# Patient Record
Sex: MALE | URBAN - METROPOLITAN AREA
[De-identification: names, ages, dates, MRNs, and addresses within clinical notes are randomized per-mention and may not be internally consistent; named-entity substitution may affect disease eponyms.]

---

## 2017-01-30 DIAGNOSIS — H40.1132 PRIMARY OPEN ANGLE GLAUCOMA OF BOTH EYES, MODERATE STAGE: Primary | ICD-10-CM

## 2017-01-30 RX ORDER — DORZOLAMIDE HYDROCHLORIDE AND TIMOLOL MALEATE 20; 5 MG/ML; MG/ML
1 SOLUTION/ DROPS OPHTHALMIC 2 TIMES DAILY
Qty: 10 ML | Refills: 2 | Status: SHIPPED | OUTPATIENT
Start: 2017-01-30 | End: 2017-08-04

## 2017-01-30 NOTE — TELEPHONE ENCOUNTER
COSOPT 2-0.5 %      Last Written Prescription Date:  11/24/15  Last Fill Quantity:  ,   # refills: 11  Last Office Visit : 7/14/16  Future Office visit:  4/9/17

## 2017-02-09 ENCOUNTER — OFFICE VISIT (OUTPATIENT)
Dept: OPHTHALMOLOGY | Facility: CLINIC | Age: 77
End: 2017-02-09
Attending: OPHTHALMOLOGY
Payer: MEDICARE

## 2017-02-09 DIAGNOSIS — H25.13 NUCLEAR SCLEROSIS, BILATERAL: ICD-10-CM

## 2017-02-09 DIAGNOSIS — H40.1190 POAG (PRIMARY OPEN-ANGLE GLAUCOMA): ICD-10-CM

## 2017-02-09 DIAGNOSIS — H40.1133 PRIMARY OPEN ANGLE GLAUCOMA OF BOTH EYES, SEVERE STAGE: Primary | ICD-10-CM

## 2017-02-09 PROCEDURE — 99214 OFFICE O/P EST MOD 30 MIN: CPT | Mod: 25,ZF

## 2017-02-09 PROCEDURE — 92083 EXTENDED VISUAL FIELD XM: CPT | Mod: ZF | Performed by: OPHTHALMOLOGY

## 2017-02-09 RX ORDER — ACETAZOLAMIDE 500 MG/1
500 CAPSULE, EXTENDED RELEASE ORAL 2 TIMES DAILY
Qty: 60 CAPSULE | Refills: 3 | Status: SHIPPED | OUTPATIENT
Start: 2017-02-09 | End: 2018-05-03

## 2017-02-09 ASSESSMENT — REFRACTION_WEARINGRX
OS_SPHERE: -0.50
OS_AXIS: 010
OD_CYLINDER: +1.25
OD_ADD: +2.75
OD_SPHERE: -0.50
OS_ADD: +2.75
OD_AXIS: 008
OS_CYLINDER: +1.50

## 2017-02-09 ASSESSMENT — VISUAL ACUITY
METHOD: SNELLEN - LINEAR
OS_PH_CC: 20/40+2
METHOD_MR: DECLINES MR TODAY.
OS_CC: 20/40-1
CORRECTION_TYPE: GLASSES
OD_CC: 20/25
OD_CC+: -3
OS_CC+: +2

## 2017-02-09 ASSESSMENT — EXTERNAL EXAM - LEFT EYE: OS_EXAM: NORMAL

## 2017-02-09 ASSESSMENT — CONF VISUAL FIELD
OD_SUPERIOR_NASAL_RESTRICTION: 3
METHOD: COUNTING FINGERS
OS_NORMAL: 1
OD_SUPERIOR_TEMPORAL_RESTRICTION: 3

## 2017-02-09 ASSESSMENT — TONOMETRY
OD_IOP_MMHG: 33
IOP_METHOD: TONOPEN
IOP_METHOD: APPLANATION
OS_IOP_MMHG: 17
OD_IOP_MMHG: 32
OS_IOP_MMHG: 17

## 2017-02-09 ASSESSMENT — SLIT LAMP EXAM - LIDS
COMMENTS: NORMAL
COMMENTS: NORMAL

## 2017-02-09 ASSESSMENT — CUP TO DISC RATIO
OD_RATIO: 0.85
OS_RATIO: 0.5

## 2017-02-09 ASSESSMENT — EXTERNAL EXAM - RIGHT EYE: OD_EXAM: NORMAL

## 2017-02-09 NOTE — PROGRESS NOTES
1)POAG -- s/p SLT OD (1/10 and 6/14) -- K pachy:531/562 Tmax: 29/28 HVF: OD:Superior arcuate stable from 3706-8175 with possible progression in 2014 and OD:Sup arcuate with IN dec sens in 2016 and OS:Fluct  CDR:0.85/0.5 HRT/OCT: OD:Mod RNFL thinning with ?prog in 2016 and OS:RNFL WNL FHX of Glc: No Gonio: open Intolerant to: Asthma/COPD: No, tolerating topical BB Steroid Use: Yes Kidney Stones: No Sulfa Allergy: No IOP targets: OD:M-Hteens and OS:Hteens-L20s  -- IOP above target OD with slow progression on HVF over several years -- rec phaco/trab OD   2)H/O Ch Nevus OS  3)NS OU -- pt leaving for 1 month vacation on March 19  4)S/p evacuation of bilateral subdural hematoma 7/2/16 -- H/O CTs and MRI at [facility] in 2016    MD:Glare test OD:20/80 and OS:20/60    Patient will continue on Cosopt (Timolol/Dorzolamide) which is a blue top drop 2x/day (12 hours apart) in both eyes, Latanoprost which is a teal top drop at bedtime in both eyes, and Alphagan (Brimonidine) which is a purple top drop 3x/day (8 hours apart) in the right eye. A long discussion of the risks, benefits, and alternatives including potential treatment and management options were had with patient and a decision was made to proceed with combined cataract and glaucoma surgery (Trabeculectomy) in the right eye.    Patient will also begin Diamox 500mg Sequels 2x/day (12 hours apart) as a temporizing measure prior to surgery.  Patient will return to clinic in 2-3 weeks with repeat IOP check prior to leaving for vacation.  Patient will talk to his primary care doctor prior to starting the medication.

## 2017-02-09 NOTE — NURSING NOTE
Chief Complaints and History of Present Illnesses   Patient presents with     Primary Open Angle Glaucoma Follow Up     7 month follow up for POAG -- s/p SLT OD (1/10 and 6/14).     HPI    Affected eye(s):  Both   Symptoms:     (Comment: Vision is unchanged BE.)   No floaters   Flashes (Comment: Gets somtimes, but not recently. Unsure what eye.)   No redness (Comment: One month ago, thinks LE was red. fine now.)   No tearing   No itching         Do you have eye pain now?:  No      Comments:  7 month follow up for POAG -- s/p SLT OD (1/10 and 6/14).    Efraín PERALTA  1:02 PM02/09/2017

## 2017-02-09 NOTE — MR AVS SNAPSHOT
After Visit Summary   2/9/2017    Rusty Harris    MRN: [id number]           Patient Information     Date Of Birth          1940        Visit Information        Provider Department      2/9/2017 1:00 PM Glenys Green MD [facility]        Today's Diagnoses     Primary open angle glaucoma of both eyes, severe stage    -  1     POAG (primary open-angle glaucoma)         Nuclear sclerosis, bilateral           Care Instructions    Patient will continue on Cosopt (Timolol/Dorzolamide) which is a blue top drop 2x/day (12 hours apart) in both eyes, Latanoprost which is a teal top drop at bedtime in both eyes, and Alphagan (Brimonidine) which is a purple top drop 3x/day (8 hours apart) in the right eye. A long discussion of the risks, benefits, and alternatives including potential treatment and management options were had with patient and a decision was made to proceed with combined cataract and glaucoma surgery (Trabeculectomy) in the right eye.    Patient will also begin Diamox 500mg Sequels 2x/day (12 hours apart) as a temporizing measure prior to surgery.  Patient will talk to his primary care doctor prior to starting the medication.          Follow-ups after your visit        Follow-up notes from your care team     Return for  2-3 weeks with repeat IOP check.      Your next 10 appointments already scheduled     Mar 02, 2017  9:15 AM   RETURN GLAUCOMA with Glenys Green MD   Eye Clinic ([facility])    Paul Whitney  [address]   [address] Clin [address] [place] [address]   [phone]            May 09, 2017 10:15 AM   Post-Op with Glenys Green MD   Eye Clinic ([facility])    Paul Whitney  [address]  [address] Clin [address] [place] [address]   [phone]            May 18, 2017 10:15 AM   Post-Op with Glenys Green MD   Eye Clinic ([facility])    Paul Whitney  [address]  [address] Clin [address] [place] [address]  [address]   [phone]              Who to contact     Please call your clinic at [phone] to:    Ask questions about your health    Make or cancel appointments    Discuss your medicines    Learn about your test results    Speak to your doctor   If you have compliments or concerns about an experience at your clinic, or if you wish to file a complaint, please contact [facility] Patient Relations at [phone] or email us at [email or web address]         Additional Information About Your Visit        [employer] Information     Infrascale is an electronic gateway that provides easy, online access to your medical records. With Infrascale, you can request a clinic appointment, read your test results, renew a prescription or communicate with your care team.     To sign up for Infrascale visit the website at [email or web address]   You will be asked to enter the access code listed below, as well as some personal information. Please follow the directions to create your username and password.     Your access code is: [email or web address]  Expires: 2017  8:30 AM     Your access code will  in 90 days. If you need help or a new code, please contact your [facility] Clinic or call [phone] for assistance.        Care EveryWhere ID     This is your Care EveryWhere ID. This could be used by other organizations to access your [place] medical records  [id number]         Blood Pressure from Last 3 Encounters:   No data found for BP    Weight from Last 3 Encounters:   No data found for Wt              We Performed the Following     OVF 24-2 Dynamic OD     Tawny-Operative Worksheet (Glaucoma)          Today's Medication Changes          These changes are accurate as of: 17  2:37 PM.  If you have any questions, ask your nurse or doctor.               Start taking these medicines.        Dose/Directions    acetaZOLAMIDE 500 MG 12 hr capsule   Commonly known as:   DIAMOX SEQUEL   Used for:  Primary open angle glaucoma of both eyes, severe stage   Started by:  Glenys Green MD        Dose:  500 mg   Take 1 capsule (500 mg) by mouth 2 times daily   Quantity:  60 capsule   Refills:  3            Where to get your medicines      These medications were sent to [facility] - [place], [place] - [address]  [address], [facility] [address]     Phone:  [phone]    - acetaZOLAMIDE 500 MG 12 hr capsule             Primary Care Provider Office Phone # Fax #    Gustabo Melendez [phone]       [facility] [address] [address]        Thank you!     Thank you for choosing EYE CLINIC  for your care. Our goal is always to provide you with excellent care. Hearing back from our patients is one way we can continue to improve our services. Please take a few minutes to complete the written survey that you may receive in the mail after your visit with us. Thank you!             Your Updated Medication List - Protect others around you: Learn how to safely use, store and throw away your medicines at www.disposemymeds.org.          This list is accurate as of: 2/9/17  2:37 PM.  Always use your most recent med list.                   Brand Name Dispense Instructions for use    acetaZOLAMIDE 500 MG 12 hr capsule    DIAMOX SEQUEL    60 capsule    Take 1 capsule (500 mg) by mouth 2 times daily       * brimonidine 0.1 % ophthalmic solution    ALPHAGAN P     Place 1 drop into the right eye 2 times daily       * brimonidine 0.2 % ophthalmic solution    ALPHAGAN    15 mL    Place 1 drop into the right eye 3 times daily       dorzolamide-timolol 2-0.5 % ophthalmic solution    COSOPT    10 mL    Place 1 drop into both eyes 2 times daily *12 HOURS APART       latanoprost 0.005 % ophthalmic solution    XALATAN    2.5 mL    Place 1 drop into both eyes At Bedtime       SIMVASTATIN PO          * Notice:  This list  has 2 medication(s) that are the same as other medications prescribed for you. Read the directions carefully, and ask your doctor or other care provider to review them with you.

## 2017-02-10 DIAGNOSIS — H40.1132 PRIMARY OPEN ANGLE GLAUCOMA OF BOTH EYES, MODERATE STAGE: Primary | ICD-10-CM

## 2017-02-13 RX ORDER — BRIMONIDINE TARTRATE 2 MG/ML
1 SOLUTION/ DROPS OPHTHALMIC 3 TIMES DAILY
Qty: 15 ML | Refills: 3 | Status: SHIPPED | OUTPATIENT
Start: 2017-02-13 | End: 2018-03-16

## 2017-02-13 NOTE — TELEPHONE ENCOUNTER
brimonidine (ALPHAGAN) 0.2 %      Last Written Prescription Date:  11/24/15  Last Fill Quantity: 15ML,   # refills: 11  Last Office Visit : 2/9/17  Future Office visit:  3/2/17

## 2017-03-09 ENCOUNTER — OFFICE VISIT (OUTPATIENT)
Dept: OPHTHALMOLOGY | Facility: CLINIC | Age: 77
End: 2017-03-09
Attending: OPHTHALMOLOGY
Payer: MEDICARE

## 2017-03-09 DIAGNOSIS — H25.13 NUCLEAR SCLEROSIS, BILATERAL: ICD-10-CM

## 2017-03-09 DIAGNOSIS — H40.1133 PRIMARY OPEN ANGLE GLAUCOMA OF BOTH EYES, SEVERE STAGE: Primary | ICD-10-CM

## 2017-03-09 PROCEDURE — 99212 OFFICE O/P EST SF 10 MIN: CPT | Mod: ZF

## 2017-03-09 ASSESSMENT — TONOMETRY
OS_IOP_MMHG: 12
IOP_METHOD: APPLANATION
OD_IOP_MMHG: 22

## 2017-03-09 ASSESSMENT — VISUAL ACUITY
OD_CC: 20/25
CORRECTION_TYPE: GLASSES
METHOD: SNELLEN - LINEAR
OD_CC+: -1
OS_CC: 20/40
OS_PH_CC: 20/40+2
OS_CC+: -1

## 2017-03-09 ASSESSMENT — SLIT LAMP EXAM - LIDS
COMMENTS: NORMAL
COMMENTS: NORMAL

## 2017-03-09 ASSESSMENT — EXTERNAL EXAM - RIGHT EYE: OD_EXAM: NORMAL

## 2017-03-09 ASSESSMENT — REFRACTION_WEARINGRX
OS_CYLINDER: +1.50
OD_AXIS: 008
OD_ADD: +2.75
OD_CYLINDER: +1.25
OD_SPHERE: -0.50
OS_AXIS: 010
OS_ADD: +2.75
OS_SPHERE: -0.50

## 2017-03-09 ASSESSMENT — EXTERNAL EXAM - LEFT EYE: OS_EXAM: NORMAL

## 2017-03-09 NOTE — MR AVS SNAPSHOT
After Visit Summary   3/9/2017    Rusty Harris    MRN: [id number]           Patient Information     Date Of Birth          1940        Visit Information        Provider Department      3/9/2017 8:00 AM Glenys Green MD [facility]        Today's Diagnoses     Primary open angle glaucoma of both eyes, severe stage    -  1    Nuclear sclerosis, bilateral          Care Instructions    Patient will continue on Cosopt (Timolol/Dorzolamide) which is a blue top drop 2x/day (12 hours apart) in both eyes, Latanoprost which is a teal top drop at bedtime in both eyes, and Alphagan (Brimonidine) which is a purple top drop 3x/day (8 hours apart) in the right eye. A long discussion of the risks, benefits, and alternatives including potential treatment and management options were had with patient and a decision was made to proceed with combined cataract and glaucoma surgery (Trabeculectomy) in the right eye.      Patient will also continue on Diamox 500mg Sequels 2x/day (12 hours apart) as a temporizing measure prior to surgery.   Patient will talk to his primary care doctor prior to starting the medication.          Follow-ups after your visit        Your next 10 appointments already scheduled     Apr 25, 2017 10:15 AM CDT   Post-Op with Glenys Green MD   Eye Clinic ([facility])    Paul Ageeg  [address] [place] [address]   [phone]            May 02, 2017  1:00 PM CDT   Post-Op with Glenys Green MD   Eye Clinic ([facility])    Paul Gage Blg  [address]   [address] [address]   [phone]            May 16, 2017 10:15 AM CDT   Post-Op with Glenys Green MD   Eye Clinic ([facility])    Paul Gage Blg  [address]   [address] [address]   [phone]            May 25, 2017 10:00 AM CDT   Post-Op with Glenys Green MD   Eye Clinic ([facility])    [facility]  [address]  9[address] Clin [address]  [facility] [address]   [phone]              Who to contact     Please call your clinic at [phone] to:    Ask questions about your health    Make or cancel appointments    Discuss your medicines    Learn about your test results    Speak to your doctor   If you have compliments or concerns about an experience at your clinic, or if you wish to file a complaint, please contact [facility] Patient Relations at [phone] or email us at [email or web address]         Additional Information About Your Visit        [employer] Information     InsightETEt is an electronic gateway that provides easy, online access to your medical records. With [employer], you can request a clinic appointment, read your test results, renew a prescription or communicate with your care team.     To sign up for InsightETEt visit the website at [email or web address]   You will be asked to enter the access code listed below, as well as some personal information. Please follow the directions to create your username and password.     Your access code is: [email or web address]  Expires: 2017  8:30 AM     Your access code will  in 90 days. If you need help or a new code, please contact your [facility] Clinic or call [phone] for assistance.        Care EveryWhere ID     This is your Care EveryWhere ID. This could be used by other organizations to access your [place] medical records  [id number]         Blood Pressure from Last 3 Encounters:   No data found for BP    Weight from Last 3 Encounters:   No data found for Wt              We Performed the Following     Tawny-Operative Worksheet (Glaucoma)        Primary Care Provider Office Phone # Fax #    Gustabo Melendez [phone]       [address] [address]        Thank you!     Thank you for  choosing EYE CLINIC  for your care. Our goal is always to provide you with excellent care. Hearing back from our patients is one way we can continue to improve our services. Please take a few minutes to complete the written survey that you may receive in the mail after your visit with us. Thank you!             Your Updated Medication List - Protect others around you: Learn how to safely use, store and throw away your medicines at www.disposemymeds.org.          This list is accurate as of: 3/9/17  9:00 AM.  Always use your most recent med list.                   Brand Name Dispense Instructions for use    acetaZOLAMIDE 500 MG 12 hr capsule    DIAMOX SEQUEL    60 capsule    Take 1 capsule (500 mg) by mouth 2 times daily       * brimonidine 0.1 % ophthalmic solution    ALPHAGAN P     Place 1 drop into the right eye 2 times daily       * brimonidine 0.2 % ophthalmic solution    ALPHAGAN    15 mL    Place 1 drop into the right eye 3 times daily       dorzolamide-timolol 2-0.5 % ophthalmic solution    COSOPT    10 mL    Place 1 drop into both eyes 2 times daily *12 HOURS APART       latanoprost 0.005 % ophthalmic solution    XALATAN    2.5 mL    Place 1 drop into both eyes At Bedtime       SIMVASTATIN PO          * Notice:  This list has 2 medication(s) that are the same as other medications prescribed for you. Read the directions carefully, and ask your doctor or other care provider to review them with you.

## 2017-03-09 NOTE — NURSING NOTE
Chief Complaints and History of Present Illnesses   Patient presents with     Glaucoma Follow Up     4 week follow up IOP check     HPI    Affected eye(s):  Both   Symptoms:     No floaters   No flashes   No redness   No Dryness         Do you have eye pain now?:  No      Comments:  Pt states vision is about the same as last visit.   Brian JOSE March 9, 2017 7:52 AM

## 2017-03-09 NOTE — PROGRESS NOTES
1)POAG -- s/p SLT OD (1/10 and 6/14) -- K pachy:531/562 Tmax: 29/28 HVF: OD:Superior arcuate stable from 6167-6343 with possible progression in 2014 and OD:Sup arcuate with IN dec sens in 2016 and OS:Fluct  CDR:0.85/0.5 HRT/OCT: OD:Mod RNFL thinning with ?prog in 2016 and OS:RNFL WNL FHX of Glc: No Gonio: open Intolerant to: Asthma/COPD: No, tolerating topical BB Steroid Use: Yes Kidney Stones: No Sulfa Allergy: No IOP targets: OD:M-Hteens and OS:Hteens-L20s  -- IOP above target OD with slow progression on HVF over several years -- rec phaco/trab OD   2)H/O Ch Nevus OS  3)NS OU -- pt leaving for 1 month vacation on March 19 -- rec OD:Phaco/trab and OS:Phaco/MIGS -- pt will call if intersted in doing the left eye prior to right eye  4)S/p evacuation of bilateral subdural hematoma 7/2/16 -- H/O CTs and MRI at [facility] in 2016    MD:Glare test OD:20/80 and OS:20/60    Patient will continue on Cosopt (Timolol/Dorzolamide) which is a blue top drop 2x/day (12 hours apart) in both eyes, Latanoprost which is a teal top drop at bedtime in both eyes, and Alphagan (Brimonidine) which is a purple top drop 3x/day (8 hours apart) in the right eye. A long discussion of the risks, benefits, and alternatives including potential treatment and management options were had with patient and a decision was made to proceed with combined cataract and glaucoma surgery (Trabeculectomy) in the right eye.      Patient will also continue on Diamox 500mg Sequels 2x/day (12 hours apart) as a temporizing measure prior to surgery.   Patient will talk to his primary care doctor prior to starting the medication.      Attending Physician Attestation:  Complete documentation of historical and exam elements from today's encounter can be found in the full encounter summary report (not reduplicated in this progress note). I personally obtained the chief complaint(s) and history of present illness.  I confirmed and edited as necessary the review of systems,  past medical/surgical history, family history, social history, and examination findings as documented by others; and I examined the patient myself. I personally reviewed the relevant tests, images, and reports as documented above. I formulated and edited as necessary the assessment and plan and discussed the findings and management plan with the patient and family.  - Glenys Green MD 3:52 PM 3/29/2017

## 2017-03-09 NOTE — PATIENT INSTRUCTIONS
Patient will continue on Cosopt (Timolol/Dorzolamide) which is a blue top drop 2x/day (12 hours apart) in both eyes, Latanoprost which is a teal top drop at bedtime in both eyes, and Alphagan (Brimonidine) which is a purple top drop 3x/day (8 hours apart) in the right eye. A long discussion of the risks, benefits, and alternatives including potential treatment and management options were had with patient and a decision was made to proceed with combined cataract and glaucoma surgery (Trabeculectomy) in the right eye.      Patient will also continue on Diamox 500mg Sequels 2x/day (12 hours apart) as a temporizing measure prior to surgery.   Patient will talk to his primary care doctor prior to starting the medication.

## 2017-04-24 ENCOUNTER — TRANSFERRED RECORDS (OUTPATIENT)
Dept: HEALTH INFORMATION MANAGEMENT | Facility: CLINIC | Age: 77
End: 2017-04-24

## 2017-04-25 ENCOUNTER — OFFICE VISIT (OUTPATIENT)
Dept: OPHTHALMOLOGY | Facility: CLINIC | Age: 77
End: 2017-04-25
Attending: OPHTHALMOLOGY
Payer: MEDICARE

## 2017-04-25 DIAGNOSIS — H40.1133 PRIMARY OPEN ANGLE GLAUCOMA OF BOTH EYES, SEVERE STAGE: Primary | ICD-10-CM

## 2017-04-25 PROCEDURE — 99212 OFFICE O/P EST SF 10 MIN: CPT | Mod: ZF

## 2017-04-25 RX ORDER — KETOROLAC TROMETHAMINE 5 MG/ML
1 SOLUTION OPHTHALMIC 3 TIMES DAILY
Qty: 1 BOTTLE | Refills: 0 | Status: SHIPPED | OUTPATIENT
Start: 2017-04-25 | End: 2017-09-21

## 2017-04-25 RX ORDER — CIPROFLOXACIN HYDROCHLORIDE 3.5 MG/ML
1 SOLUTION/ DROPS TOPICAL 4 TIMES DAILY
Qty: 1 BOTTLE | Refills: 0 | Status: SHIPPED | OUTPATIENT
Start: 2017-04-25 | End: 2017-05-25

## 2017-04-25 RX ORDER — PREDNISOLONE ACETATE 10 MG/ML
1 SUSPENSION/ DROPS OPHTHALMIC
Qty: 1 BOTTLE | Refills: 3 | Status: SHIPPED | OUTPATIENT
Start: 2017-04-25 | End: 2017-09-21

## 2017-04-25 ASSESSMENT — EXTERNAL EXAM - RIGHT EYE: OD_EXAM: NORMAL

## 2017-04-25 ASSESSMENT — VISUAL ACUITY
OS_SC: 20/40
OD_SC+: +1
OD_PH_SC: 20/30-1
OS_SC+: -3
METHOD: SNELLEN - LINEAR
OS_PH_SC: 20/25-3
OD_SC: 20/70

## 2017-04-25 ASSESSMENT — TONOMETRY
IOP_METHOD: APPLANATION
OS_IOP_MMHG: 16
OD_IOP_MMHG: 30

## 2017-04-25 ASSESSMENT — CONF VISUAL FIELD
OD_SUPERIOR_TEMPORAL_RESTRICTION: 3
OS_NORMAL: 1
OD_SUPERIOR_NASAL_RESTRICTION: 3

## 2017-04-25 ASSESSMENT — SLIT LAMP EXAM - LIDS
COMMENTS: NORMAL
COMMENTS: NORMAL

## 2017-04-25 ASSESSMENT — EXTERNAL EXAM - LEFT EYE: OS_EXAM: NORMAL

## 2017-04-25 NOTE — NURSING NOTE
Chief Complaints and History of Present Illnesses   Patient presents with     Post Op (Ophthalmology) Right Eye     S/P CE IOL with Trab with [facility] 1 day     HPI    Last Eye Exam:  3/9/17   Affected eye(s):  Right   Symptoms:     No floaters   No flashes      Duration:  1 day   Frequency:  Constant       Do you have eye pain now?:  No      Comments:  Pt slept well last night. Vision is improved today. Denies any pain or discomfort. MD please review with pt post operative instructions today. MATHIEU SMITH, COA 10:30 AM 04/25/2017

## 2017-04-25 NOTE — PROGRESS NOTES
1)POAG -- s/p Trab OD (4/24/17), s/p SLT OD (1/10 and 6/14) -- K pachy:531/562 Tmax: 29/28 HVF: OD:Superior arcuate stable from 5194-3505 with possible progression in 2014 and OD:Sup arcuate with IN dec sens in 2016 and OS:Fluct  CDR:0.85/0.5 HRT/OCT: OD:Mod RNFL thinning with ?prog in 2016 and OS:RNFL WNL FHX of Glc: No Gonio: open Intolerant to: Asthma/COPD: No, tolerating topical BB Steroid Use: Yes Kidney Stones: No Sulfa Allergy: No IOP targets: OD:M-Hteens and OS:Hteens-L20s  --  2)H/O Ch Nevus OS  3)NS OS -- rec OD:Phaco/trab and OS:Phaco/MIGS   4)S/p evacuation of bilateral subdural hematoma 7/2/16 -- H/O CTs and MRI at [facility] in 2016  5)PCIOL OD    MD:Glare test OD:20/80 and OS:20/60    Patient will continue on Cosopt (Timolol/Dorzolamide) which is a blue top drop 2x/day (12 hours apart) in the left eye only, Latanoprost which is a teal top drop at bedtime in the left eye only, and discontinue and hold onto Alphagan (Brimonidine) which is a purple top drop.  No heavy lifting, bending, stooping, straining, rubbing the eye, or getting water in the eye.  Please wear protective eyewear over the eye at all times including glasses during the day and the protective shield at bedtime.  Patient will return to clinic in 1 weeks with repeat evaluation.    Use the following drops (RIGHT EYE ONLY):  Antibiotic --  Ciprofloxacin: 4x/day until finished (use for at least 5-7 days after surgery)    NSAID -- Acular LS (Ketorolac) 3x/day: for approximately 4 weeks after surgery (or until gone)    Steroid -- Pred Forte/Prednisolone Acetate:  Every 2 hours while awake    Please be sure to call if you have any decrease in vision, increase in pain, flashing lights, redness, or a lot of drainage.

## 2017-04-25 NOTE — PATIENT INSTRUCTIONS
Patient will continue on Cosopt (Timolol/Dorzolamide) which is a blue top drop 2x/day (12 hours apart) in the left eye only, Latanoprost which is a teal top drop at bedtime in the left eye only, and discontinue and hold onto Alphagan (Brimonidine) which is a purple top drop.  No heavy lifting, bending, stooping, straining, rubbing the eye, or getting water in the eye.  Please wear protective eyewear over the eye at all times including glasses during the day and the protective shield at bedtime.  Patient will return to clinic in 1 weeks with repeat evaluation.    Use the following drops (RIGHT EYE ONLY):  Antibiotic --  Ciprofloxacin: 4x/day until finished (use for at least 5-7 days after surgery)    NSAID -- Acular LS (Ketorolac) 3x/day: for approximately 4 weeks after surgery (or until gone)    Steroid -- Pred Forte/Prednisolone Acetate:  Every 2 hours while awake    Please be sure to call if you have any decrease in vision, increase in pain, flashing lights, redness, or a lot of drainage.

## 2017-04-25 NOTE — MR AVS SNAPSHOT
After Visit Summary   4/25/2017    Rusty Harris    MRN: [id number]           Patient Information     Date Of Birth          1940        Visit Information        Provider Department      4/25/2017 10:15 AM Glenys Green MD [facility]        Today's Diagnoses     Primary open angle glaucoma of both eyes, severe stage    -  1      Care Instructions    Patient will continue on Cosopt (Timolol/Dorzolamide) which is a blue top drop 2x/day (12 hours apart) in the left eye only, Latanoprost which is a teal top drop at bedtime in the left eye only, and discontinue and hold onto Alphagan (Brimonidine) which is a purple top drop.  No heavy lifting, bending, stooping, straining, rubbing the eye, or getting water in the eye.  Please wear protective eyewear over the eye at all times including glasses during the day and the protective shield at bedtime.  Patient will return to clinic in 1 weeks with repeat evaluation.    Use the following drops (RIGHT EYE ONLY):  Antibiotic --  Ciprofloxacin: 4x/day until finished (use for at least 5-7 days after surgery)    NSAID -- Acular LS (Ketorolac) 3x/day: for approximately 4 weeks after surgery (or until gone)    Steroid -- Pred Forte/Prednisolone Acetate:  Every 2 hours while awake    Please be sure to call if you have any decrease in vision, increase in pain, flashing lights, redness, or a lot of drainage.          Follow-ups after your visit        Your next 10 appointments already scheduled     May 02, 2017  1:00 PM CDT   Post-Op with Glenys Green MD   Eye Clinic ([facility])    Paul Gage [address]  [address] [address]   [phone]              Who to contact     Please call your clinic at [phone] to:    Ask questions about your health    Make or cancel appointments    Discuss your medicines    Learn about your test results    Speak to your doctor   If you have compliments or concerns about an  experience at your clinic, or if you wish to file a complaint, please contact [facility] Patient Relations at [phone] or email us at [email or web address]         Additional Information About Your Visit        The Wet SealharUUSEE Information     HealthMedia is an electronic gateway that provides easy, online access to your medical records. With HealthMedia, you can request a clinic appointment, read your test results, renew a prescription or communicate with your care team.     To sign up for HealthMedia visit the website at [email or web address]   You will be asked to enter the access code listed below, as well as some personal information. Please follow the directions to create your username and password.     Your access code is: [email or web address]  Expires: 2017  9:30 AM     Your access code will  in 90 days. If you need help or a new code, please contact your [facility] or call [phone] for assistance.        Care EveryWhere ID     This is your Care EveryWhere ID. This could be used by other organizations to access your [place] medical records  [id number]         Blood Pressure from Last 3 Encounters:   No data found for BP    Weight from Last 3 Encounters:   No data found for Wt              Today, you had the following     No orders found for display         Today's Medication Changes          These changes are accurate as of: 17 11:57 AM.  If you have any questions, ask your nurse or doctor.               Start taking these medicines.        Dose/Directions    ciprofloxacin 0.3 % ophthalmic solution   Commonly known as:  CILOXAN   Used for:  Primary open angle glaucoma of both eyes, severe stage   Started by:  Glenys Green MD        Dose:  1 drop   Place 1 drop into the right eye 4 times daily   Quantity:  1 Bottle   Refills:  0       ketorolac 0.5 % ophthalmic solution   Commonly known as:  ACULAR   Used for:  Primary open angle  glaucoma of both eyes, severe stage   Started by:  Glenys Green MD        Dose:  1 drop   Place 1 drop into the right eye 3 times daily   Quantity:  1 Bottle   Refills:  0       prednisoLONE acetate 1 % ophthalmic susp   Commonly known as:  PRED FORTE   Used for:  Primary open angle glaucoma of both eyes, severe stage   Started by:  Glenys Green MD        Dose:  1 drop   Place 1 drop into the right eye every 2 hours   Quantity:  1 Bottle   Refills:  3            Where to get your medicines      These medications were sent to [facility] - [address], [facility] [address]     Phone:  [phone]     ciprofloxacin 0.3 % ophthalmic solution    ketorolac 0.5 % ophthalmic solution    prednisoLONE acetate 1 % ophthalmic susp                Primary Care Provider Office Phone # Fax #    Gustabo Melendez [phone]       [address] [address]        Thank you!     Thank you for choosing EYE CLINIC  for your care. Our goal is always to provide you with excellent care. Hearing back from our patients is one way we can continue to improve our services. Please take a few minutes to complete the written survey that you may receive in the mail after your visit with us. Thank you!             Your Updated Medication List - Protect others around you: Learn how to safely use, store and throw away your medicines at www.disposemymeds.org.          This list is accurate as of: 4/25/17 11:57 AM.  Always use your most recent med list.                   Brand Name Dispense Instructions for use    acetaZOLAMIDE 500 MG 12 hr capsule    DIAMOX SEQUEL    60 capsule    Take 1 capsule (500 mg) by mouth 2 times daily       * brimonidine 0.1 % ophthalmic solution    ALPHAGAN P     Place 1 drop into the right eye 2 times daily       * brimonidine 0.2 % ophthalmic solution    ALPHAGAN    15 mL    Place 1  drop into the right eye 3 times daily       ciprofloxacin 0.3 % ophthalmic solution    CILOXAN    1 Bottle    Place 1 drop into the right eye 4 times daily       dorzolamide-timolol 2-0.5 % ophthalmic solution    COSOPT    10 mL    Place 1 drop into both eyes 2 times daily *12 HOURS APART       ketorolac 0.5 % ophthalmic solution    ACULAR    1 Bottle    Place 1 drop into the right eye 3 times daily       latanoprost 0.005 % ophthalmic solution    XALATAN    2.5 mL    Place 1 drop into both eyes At Bedtime       prednisoLONE acetate 1 % ophthalmic susp    PRED FORTE    1 Bottle    Place 1 drop into the right eye every 2 hours       SIMVASTATIN PO          * Notice:  This list has 2 medication(s) that are the same as other medications prescribed for you. Read the directions carefully, and ask your doctor or other care provider to review them with you.

## 2017-05-02 ENCOUNTER — OFFICE VISIT (OUTPATIENT)
Dept: OPHTHALMOLOGY | Facility: CLINIC | Age: 77
End: 2017-05-02
Attending: OPHTHALMOLOGY
Payer: MEDICARE

## 2017-05-02 DIAGNOSIS — H40.1132 PRIMARY OPEN ANGLE GLAUCOMA OF BOTH EYES, MODERATE STAGE: Primary | ICD-10-CM

## 2017-05-02 PROCEDURE — 99212 OFFICE O/P EST SF 10 MIN: CPT | Mod: ZF

## 2017-05-02 ASSESSMENT — SLIT LAMP EXAM - LIDS
COMMENTS: NORMAL
COMMENTS: NORMAL

## 2017-05-02 ASSESSMENT — TONOMETRY
OD_IOP_MMHG: 29
IOP_METHOD: APPLANATION
OS_IOP_MMHG: 19

## 2017-05-02 ASSESSMENT — VISUAL ACUITY
OD_CC+: -2
OD_CC: 20/20
OS_PH_CC: 20/30
METHOD: SNELLEN - LINEAR
OS_CC: 20/40
OS_CC+: +2

## 2017-05-02 ASSESSMENT — CONF VISUAL FIELD
OD_INFERIOR_NASAL_RESTRICTION: 3
OD_SUPERIOR_TEMPORAL_RESTRICTION: 3
OD_INFERIOR_TEMPORAL_RESTRICTION: 3
OS_NORMAL: 1
OD_SUPERIOR_NASAL_RESTRICTION: 3

## 2017-05-02 ASSESSMENT — EXTERNAL EXAM - RIGHT EYE: OD_EXAM: NORMAL

## 2017-05-02 ASSESSMENT — EXTERNAL EXAM - LEFT EYE: OS_EXAM: NORMAL

## 2017-05-02 NOTE — PROGRESS NOTES
MD:  IOP OD: 28 -> massage ->20 -> LSL -> 15 (with development of bleb leak)    1)POAG -- s/p Trab OD (4/24/17), s/p SLT OD (1/10 and 6/14) -- K pachy:531/562 Tmax: 29/28 HVF: OD:Superior arcuate stable from 9145-9785 with possible progression in 2014 and OD:Sup arcuate with IN dec sens in 2016 and OS:Fluct  CDR:0.85/0.5 HRT/OCT: OD:Mod RNFL thinning with ?prog in 2016 and OS:RNFL WNL FHX of Glc: No Gonio: open Intolerant to: Asthma/COPD: No, tolerating topical BB Steroid Use: Yes Kidney Stones: No Sulfa Allergy: No IOP targets: OD:M-Hteens and OS:Hteens-L20s  --  2)H/O Ch Nevus OS  3)NS OS -- rec OD:Phaco/trab and OS:Phaco/MIGS   4)S/p evacuation of bilateral subdural hematoma 7/2/16 -- H/O CTs and MRI at [facility] in 2016  5)PCIOL OD  6)Bleb Leak -- developed after LSL -- Kontour 16mm 8.6 BCTL placed    S/p LSL of middle suture    MD (done previously):Glare test OD:20/80 and OS:20/60    Patient will continue on Cosopt (Timolol/Dorzolamide) which is a blue top drop 2x/day (12 hours apart) in the left eye only, Latanoprost which is a teal top drop at bedtime in the left eye only, and discontinue and hold onto Alphagan (Brimonidine) which is a purple top drop.  No heavy lifting, bending, stooping, straining, rubbing the eye, or getting water in the eye.  Please wear protective eyewear over the eye at all times including glasses during the day and the protective shield at bedtime.  Patient will return to clinic in 1 weeks with repeat evaluation.    Use the following drops (RIGHT EYE ONLY):  Antibiotic --  Ciprofloxacin: 4x/day     NSAID -- Acular LS (Ketorolac) : Discontinue    Steroid -- Pred Forte/Prednisolone Acetate:  4x/day    Please be sure to call if you have any decrease in vision, increase in pain, flashing lights, redness, or a lot of drainage.

## 2017-05-02 NOTE — PATIENT INSTRUCTIONS
Patient will continue on Cosopt (Timolol/Dorzolamide) which is a blue top drop 2x/day (12 hours apart) in the left eye only, Latanoprost which is a teal top drop at bedtime in the left eye only, and discontinue and hold onto Alphagan (Brimonidine) which is a purple top drop.  No heavy lifting, bending, stooping, straining, rubbing the eye, or getting water in the eye.  Please wear protective eyewear over the eye at all times including glasses during the day and the protective shield at bedtime.  Patient will return to clinic in 1 weeks with repeat evaluation.    Use the following drops (RIGHT EYE ONLY):  Antibiotic --  Ciprofloxacin: 4x/day     NSAID -- Acular LS (Ketorolac) : Discontinue    Steroid -- Pred Forte/Prednisolone Acetate:  4x/day    Please be sure to call if you have any decrease in vision, increase in pain, flashing lights, redness, or a lot of drainage.

## 2017-05-02 NOTE — MR AVS SNAPSHOT
After Visit Summary   5/2/2017    Rusty Harris    MRN: [id number]           Patient Information     Date Of Birth          1940        Visit Information        Provider Department      5/2/2017 1:00 PM Glenys Green MD [facility]        Today's Diagnoses     Primary open angle glaucoma of both eyes, moderate stage    -  1      Care Instructions    Patient will continue on Cosopt (Timolol/Dorzolamide) which is a blue top drop 2x/day (12 hours apart) in the left eye only, Latanoprost which is a teal top drop at bedtime in the left eye only, and discontinue and hold onto Alphagan (Brimonidine) which is a purple top drop.  No heavy lifting, bending, stooping, straining, rubbing the eye, or getting water in the eye.  Please wear protective eyewear over the eye at all times including glasses during the day and the protective shield at bedtime.  Patient will return to clinic in 1 weeks with repeat evaluation.    Use the following drops (RIGHT EYE ONLY):  Antibiotic --  Ciprofloxacin: 4x/day     NSAID -- Acular LS (Ketorolac) : Discontinue    Steroid -- Pred Forte/Prednisolone Acetate:  4x/day    Please be sure to call if you have any decrease in vision, increase in pain, flashing lights, redness, or a lot of drainage.            Follow-ups after your visit        Who to contact     Please call your clinic at [phone] to:    Ask questions about your health    Make or cancel appointments    Discuss your medicines    Learn about your test results    Speak to your doctor   If you have compliments or concerns about an experience at your clinic, or if you wish to file a complaint, please contact [facility] Patient Relations at [phone] or email us at [email or web address]         Additional Information About Your Visit        OZ Communicationshart Information     Storemates is an electronic gateway that provides easy, online access to your medical records. With Storemates, you  can request a clinic appointment, read your test results, renew a prescription or communicate with your care team.     To sign up for Concurrent Thinking visit the website at [email or web address]   You will be asked to enter the access code listed below, as well as some personal information. Please follow the directions to create your username and password.     Your access code is: [email or web address]  Expires: 2017  2:53 PM     Your access code will  in 90 days. If you need help or a new code, please contact your [facility] or call [phone] for assistance.        Care EveryWhere ID     This is your Care EveryWhere ID. This could be used by other organizations to access your [place] medical records  [id number]         Blood Pressure from Last 3 Encounters:   No data found for BP    Weight from Last 3 Encounters:   No data found for Wt              Today, you had the following     No orders found for display       Primary Care Provider Office Phone # Fax #    Gustabo Melendez [phone]       [address] [address]        Thank you!     Thank you for choosing EYE CLINIC  for your care. Our goal is always to provide you with excellent care. Hearing back from our patients is one way we can continue to improve our services. Please take a few minutes to complete the written survey that you may receive in the mail after your visit with us. Thank you!             Your Updated Medication List - Protect others around you: Learn how to safely use, store and throw away your medicines at www.disposemymeds.org.          This list is accurate as of: 17  2:55 PM.  Always use your most recent med list.                   Brand Name Dispense Instructions for use    acetaZOLAMIDE 500 MG 12 hr capsule    DIAMOX SEQUEL    60 capsule    Take 1 capsule (500 mg) by mouth 2 times daily       * brimonidine 0.1 % ophthalmic solution     ALPHAGAN P     Place 1 drop into the right eye 2 times daily       * brimonidine 0.2 % ophthalmic solution    ALPHAGAN    15 mL    Place 1 drop into the right eye 3 times daily       ciprofloxacin 0.3 % ophthalmic solution    CILOXAN    1 Bottle    Place 1 drop into the right eye 4 times daily       dorzolamide-timolol 2-0.5 % ophthalmic solution    COSOPT    10 mL    Place 1 drop into both eyes 2 times daily *12 HOURS APART       ketorolac 0.5 % ophthalmic solution    ACULAR    1 Bottle    Place 1 drop into the right eye 3 times daily       latanoprost 0.005 % ophthalmic solution    XALATAN    2.5 mL    Place 1 drop into both eyes At Bedtime       prednisoLONE acetate 1 % ophthalmic susp    PRED FORTE    1 Bottle    Place 1 drop into the right eye every 2 hours       SIMVASTATIN PO          * Notice:  This list has 2 medication(s) that are the same as other medications prescribed for you. Read the directions carefully, and ask your doctor or other care provider to review them with you.

## 2017-05-02 NOTE — NURSING NOTE
Chief Complaints and History of Present Illnesses   Patient presents with     Post Op (Ophthalmology) Right Eye     ceiol/trab     HPI    Affected eye(s):  Both   Symptoms:        Frequency:  Constant       Do you have eye pain now?:  No      Comments:  Feels that the va is better  .No red  or dry  +watery occ  Danay Alberto COT 1:05 PM May 2, 2017

## 2017-05-11 ENCOUNTER — OFFICE VISIT (OUTPATIENT)
Dept: OPHTHALMOLOGY | Facility: CLINIC | Age: 77
End: 2017-05-11
Attending: OPHTHALMOLOGY
Payer: MEDICARE

## 2017-05-11 DIAGNOSIS — H40.1133 PRIMARY OPEN ANGLE GLAUCOMA OF BOTH EYES, SEVERE STAGE: Primary | ICD-10-CM

## 2017-05-11 PROCEDURE — 99213 OFFICE O/P EST LOW 20 MIN: CPT | Mod: ZF

## 2017-05-11 RX ORDER — LATANOPROST 50 UG/ML
1 SOLUTION/ DROPS OPHTHALMIC AT BEDTIME
COMMUNITY
End: 2017-06-29

## 2017-05-11 RX ORDER — DORZOLAMIDE HYDROCHLORIDE AND TIMOLOL MALEATE 20; 5 MG/ML; MG/ML
1 SOLUTION/ DROPS OPHTHALMIC 2 TIMES DAILY
COMMUNITY
End: 2017-06-29

## 2017-05-11 RX ORDER — PREDNISOLONE ACETATE 10 MG/ML
1 SUSPENSION/ DROPS OPHTHALMIC 4 TIMES DAILY
COMMUNITY
End: 2017-06-29

## 2017-05-11 ASSESSMENT — REFRACTION_WEARINGRX
OD_SPHERE: -0.50
OD_ADD: +2.75
OD_CYLINDER: +1.25
OS_CYLINDER: +1.50
OS_ADD: +2.75
OS_SPHERE: -0.50
OD_AXIS: 008
OS_AXIS: 010

## 2017-05-11 ASSESSMENT — VISUAL ACUITY
OD_SC+: -2
METHOD: SNELLEN - LINEAR
OS_PH_SC: 20/40+1
OD_SC: 20/50
OS_SC: 20/50
OS_SC+: -1

## 2017-05-11 ASSESSMENT — EXTERNAL EXAM - RIGHT EYE: OD_EXAM: NORMAL

## 2017-05-11 ASSESSMENT — SLIT LAMP EXAM - LIDS
COMMENTS: NORMAL
COMMENTS: NORMAL

## 2017-05-11 ASSESSMENT — EXTERNAL EXAM - LEFT EYE: OS_EXAM: NORMAL

## 2017-05-11 ASSESSMENT — TONOMETRY
OS_IOP_MMHG: 16
IOP_METHOD: APPLANATION
OD_IOP_MMHG: 11

## 2017-05-11 NOTE — NURSING NOTE
Chief Complaints and History of Present Illnesses   Patient presents with     Glaucoma Follow Up     HPI    Symptoms:     Blurred vision (Comment: right eye, intermittent)   No decreased vision   No floaters   No flashes         Do you have eye pain now?:  No      Comments:  One week glaucoma follow up.  KATHRYN Pendleton 7:23 AM 05/11/2017

## 2017-05-11 NOTE — PATIENT INSTRUCTIONS
Patient will continue on Cosopt (Timolol/Dorzolamide) which is a blue top drop 2x/day (12 hours apart) in the left eye only, Latanoprost which is a teal top drop at bedtime in the left eye only, and discontinue and hold onto Alphagan (Brimonidine) which is a purple top drop.  No heavy lifting, bending, stooping, straining, rubbing the eye, or getting water in the eye.  Please wear protective eyewear over the eye at all times including glasses during the day and the protective shield at bedtime.  Patient will return to clinic in 1-2 weeks with repeat evaluation.    Use the following drops (RIGHT EYE ONLY):  Antibiotic --  Ciprofloxacin: 4x/day     NSAID -- Acular LS (Ketorolac) : Discontinue    Steroid -- Pred Forte/Prednisolone Acetate:  4x/day    Please be sure to call if you have any decrease in vision, increase in pain, flashing lights, redness, or a lot of drainage.

## 2017-05-11 NOTE — MR AVS SNAPSHOT
After Visit Summary   5/11/2017    Rusty Harris    MRN: [id number]           Patient Information     Date Of Birth          1940        Visit Information        Provider Department      5/11/2017 7:30 AM Glenys Green MD [facility]        Today's Diagnoses     Primary open angle glaucoma of both eyes, severe stage    -  1      Care Instructions    Patient will continue on Cosopt (Timolol/Dorzolamide) which is a blue top drop 2x/day (12 hours apart) in the left eye only, Latanoprost which is a teal top drop at bedtime in the left eye only, and discontinue and hold onto Alphagan (Brimonidine) which is a purple top drop.  No heavy lifting, bending, stooping, straining, rubbing the eye, or getting water in the eye.  Please wear protective eyewear over the eye at all times including glasses during the day and the protective shield at bedtime.  Patient will return to clinic in 1-2 weeks with repeat evaluation.    Use the following drops (RIGHT EYE ONLY):  Antibiotic --  Ciprofloxacin: 4x/day     NSAID -- Acular LS (Ketorolac) : Discontinue    Steroid -- Pred Forte/Prednisolone Acetate:  4x/day    Please be sure to call if you have any decrease in vision, increase in pain, flashing lights, redness, or a lot of drainage.            Follow-ups after your visit        Your next 10 appointments already scheduled     May 25, 2017  7:30 AM CDT   RETURN GLAUCOMA with Glenys Green MD   Eye Clinic ([facility])    Paul G[facility]  [address]  [address] [address]  [facility] [address]   [phone]              Who to contact     Please call your clinic at [phone] to:    Ask questions about your health    Make or cancel appointments    Discuss your medicines    Learn about your test results    Speak to your doctor   If you have compliments or concerns about an experience at your clinic, or if you wish to file a complaint, please contact [facility]  Patient Relations at [phone] or email us at [email or web address]         Additional Information About Your Visit        "[employer]"harCambridge Select Information     popchips is an electronic gateway that provides easy, online access to your medical records. With popchips, you can request a clinic appointment, read your test results, renew a prescription or communicate with your care team.     To sign up for popchips visit the website at [email or web address]   You will be asked to enter the access code listed below, as well as some personal information. Please follow the directions to create your username and password.     Your access code is: [email or web address]  Expires: 2017  2:53 PM     Your access code will  in 90 days. If you need help or a new code, please contact your [facility] or call [phone] for assistance.        Care EveryWhere ID     This is your Care EveryWhere ID. This could be used by other organizations to access your [place] medical records  [id number]         Blood Pressure from Last 3 Encounters:   No data found for BP    Weight from Last 3 Encounters:   No data found for Wt              Today, you had the following     No orders found for display         Today's Medication Changes          These changes are accurate as of: 17  8:10 AM.  If you have any questions, ask your nurse or doctor.               These medicines have changed or have updated prescriptions.        Dose/Directions    brimonidine 0.2 % ophthalmic solution   Commonly known as:  ALPHAGAN   This may have changed:  Another medication with the same name was removed. Continue taking this medication, and follow the directions you see here.   Used for:  Primary open angle glaucoma of both eyes, moderate stage   Changed by:  Glenys Green MD        Dose:  1 drop   Place 1 drop into the right eye 3 times daily   Quantity:  15 mL   Refills:  3                Primary Care Provider  Office Phone # Fax #    Gustabo Melendez [phone]       [address] [address]        Thank you!     Thank you for choosing EYE CLINIC  for your care. Our goal is always to provide you with excellent care. Hearing back from our patients is one way we can continue to improve our services. Please take a few minutes to complete the written survey that you may receive in the mail after your visit with us. Thank you!             Your Updated Medication List - Protect others around you: Learn how to safely use, store and throw away your medicines at www.disposemymeds.org.          This list is accurate as of: 5/11/17  8:10 AM.  Always use your most recent med list.                   Brand Name Dispense Instructions for use    acetaZOLAMIDE 500 MG 12 hr capsule    DIAMOX SEQUEL    60 capsule    Take 1 capsule (500 mg) by mouth 2 times daily       brimonidine 0.2 % ophthalmic solution    ALPHAGAN    15 mL    Place 1 drop into the right eye 3 times daily       ciprofloxacin 0.3 % ophthalmic solution    CILOXAN    1 Bottle    Place 1 drop into the right eye 4 times daily       * dorzolamide-timolol 2-0.5 % ophthalmic solution    COSOPT     Place 1 drop Into the left eye 2 times daily       * dorzolamide-timolol 2-0.5 % ophthalmic solution    COSOPT    10 mL    Place 1 drop into both eyes 2 times daily *12 HOURS APART       ketorolac 0.5 % ophthalmic solution    ACULAR    1 Bottle    Place 1 drop into the right eye 3 times daily       * latanoprost 0.005 % ophthalmic solution    XALATAN     Place 1 drop Into the left eye At Bedtime       * latanoprost 0.005 % ophthalmic solution    XALATAN    2.5 mL    Place 1 drop into both eyes At Bedtime       prednisoLONE acetate 1 % ophthalmic susp    PRED FORTE     Place 1 drop Into the left eye 4 times daily       prednisoLONE acetate 1 % ophthalmic susp    PRED FORTE    1 Bottle    Place 1 drop into the right eye every 2  hours       SIMVASTATIN PO          * Notice:  This list has 4 medication(s) that are the same as other medications prescribed for you. Read the directions carefully, and ask your doctor or other care provider to review them with you.

## 2017-05-11 NOTE — PROGRESS NOTES
1)POAG -- s/p Trab OD (4/24/17), s/p SLT OD (1/10 and 6/14) -- K pachy:531/562 Tmax: 29/28 HVF: OD:Superior arcuate stable from 6256-6271 with possible progression in 2014 and OD:Sup arcuate with IN dec sens in 2016 and OS:Fluct  CDR:0.85/0.5 HRT/OCT: OD:Mod RNFL thinning with ?prog in 2016 and OS:RNFL WNL FHX of Glc: No Gonio: open Intolerant to: Asthma/COPD: No, tolerating topical BB Steroid Use: Yes Kidney Stones: No Sulfa Allergy: No IOP targets: OD:M-Hteens and OS:Hteens-L20s  --  2)H/O Ch Nevus OS  3)NS OS -- rec OD:Phaco/trab and OS:Phaco/MIGS   4)S/p evacuation of bilateral subdural hematoma 7/2/16 -- H/O CTs and MRI at [facility] in 2016  5)PCIOL OD  6)H/O Bleb Leak OD -- developed after LSL (that area has now sealed with development of new leak temporally at suture site) -- Kontour 16mm 8.6 BCTL placed    S/p LSL of middle suture    MD (done previously):Glare test OD:20/80 and OS:20/60    Patient will continue on Cosopt (Timolol/Dorzolamide) which is a blue top drop 2x/day (12 hours apart) in the left eye only, Latanoprost which is a teal top drop at bedtime in the left eye only, and discontinue and hold onto Alphagan (Brimonidine) which is a purple top drop.  No heavy lifting, bending, stooping, straining, rubbing the eye, or getting water in the eye.  Please wear protective eyewear over the eye at all times including glasses during the day and the protective shield at bedtime.  Patient will return to clinic in 1-2 weeks with repeat evaluation.    Use the following drops (RIGHT EYE ONLY):  Antibiotic --  Ciprofloxacin: 4x/day     NSAID -- Acular LS (Ketorolac) : Discontinue    Steroid -- Pred Forte/Prednisolone Acetate:  4x/day    Please be sure to call if you have any decrease in vision, increase in pain, flashing lights, redness, or a lot of drainage.

## 2017-05-25 ENCOUNTER — OFFICE VISIT (OUTPATIENT)
Dept: OPHTHALMOLOGY | Facility: CLINIC | Age: 77
End: 2017-05-25
Attending: OPHTHALMOLOGY
Payer: MEDICARE

## 2017-05-25 DIAGNOSIS — H40.1133 PRIMARY OPEN ANGLE GLAUCOMA OF BOTH EYES, SEVERE STAGE: Primary | ICD-10-CM

## 2017-05-25 DIAGNOSIS — H40.1133 PRIMARY OPEN ANGLE GLAUCOMA OF BOTH EYES, SEVERE STAGE: ICD-10-CM

## 2017-05-25 PROCEDURE — 99212 OFFICE O/P EST SF 10 MIN: CPT | Mod: ZF

## 2017-05-25 RX ORDER — CIPROFLOXACIN HYDROCHLORIDE 3.5 MG/ML
SOLUTION/ DROPS TOPICAL
Qty: 5 ML | Refills: 0 | Status: CANCELLED | OUTPATIENT
Start: 2017-05-25

## 2017-05-25 RX ORDER — CIPROFLOXACIN HYDROCHLORIDE 3.5 MG/ML
1 SOLUTION/ DROPS TOPICAL 4 TIMES DAILY
Qty: 1 BOTTLE | Refills: 0 | Status: SHIPPED | OUTPATIENT
Start: 2017-05-25 | End: 2017-09-21

## 2017-05-25 ASSESSMENT — VISUAL ACUITY
OS_SC: 20/40
OD_SC+: +1
OD_SC: 20/25
METHOD: SNELLEN - LINEAR
OS_PH_SC: 20/30

## 2017-05-25 ASSESSMENT — TONOMETRY
OS_IOP_MMHG: 20
OD_IOP_MMHG: 05
IOP_METHOD: ICARE

## 2017-05-25 ASSESSMENT — EXTERNAL EXAM - RIGHT EYE: OD_EXAM: NORMAL

## 2017-05-25 ASSESSMENT — EXTERNAL EXAM - LEFT EYE: OS_EXAM: NORMAL

## 2017-05-25 ASSESSMENT — SLIT LAMP EXAM - LIDS
COMMENTS: NORMAL
COMMENTS: NORMAL

## 2017-05-25 NOTE — PROGRESS NOTES
1)POAG -- s/p Trab OD (4/24/17), s/p SLT OD (1/10 and 6/14) -- K pachy:531/562 Tmax: 29/28 HVF: OD:Superior arcuate stable from 2012-5515 with possible progression in 2014 and OD:Sup arcuate with IN dec sens in 2016 and OS:Fluct  CDR:0.85/0.5 HRT/OCT: OD:Mod RNFL thinning with ?prog in 2016 and OS:RNFL WNL FHX of Glc: No Gonio: open Intolerant to: Asthma/COPD: No, tolerating topical BB Steroid Use: Yes Kidney Stones: No Sulfa Allergy: No IOP targets: OD:M-Hteens and OS:Hteens-L20s  --  2)H/O Ch Nevus OS  3)NS OS -- rec OD:Phaco/trab and OS:Phaco/MIGS   4)S/p evacuation of bilateral subdural hematoma 7/2/16 -- H/O CTs and MRI at [facility] in 2016  5)PCIOL OD  6)H/O Bleb Leak OD -- developed after LSL (that area has now sealed with development of new leak temporally at suture site) -- Kontour 16mm 8.6 BCTL placed    S/p LSL of middle suture    MD (done previously):Glare test OD:20/80 and OS:20/60    Patient will continue on Cosopt (Timolol/Dorzolamide) which is a blue top drop 2x/day (12 hours apart) in the left eye only, Latanoprost which is a teal top drop at bedtime in the left eye only, and discontinue and hold onto Alphagan (Brimonidine) which is a purple top drop.  No heavy lifting, bending, stooping, straining, rubbing the eye, or getting water in the eye.  Please wear protective eyewear over the eye at all times including glasses during the day and the protective shield at bedtime.  A long discussion of the risks, benefits, and alternatives including potential treatment and management options were had with patient and a decision was made to proceed with bleb revision in the right eye.      Use the following drops (RIGHT EYE ONLY):  Antibiotic --  Ciprofloxacin: 4x/day     NSAID -- Acular LS (Ketorolac) : Discontinue    Steroid -- Pred Forte/Prednisolone Acetate:  2x/day    Please be sure to call if you have any decrease in vision, increase in pain, flashing lights, redness, or a lot of drainage.

## 2017-05-25 NOTE — TELEPHONE ENCOUNTER
Ciprofloxacin      Last Written Prescription Date:  4-25-17  Last Fill Quantity: 1 btl,   # refills: 0  Last Office Visit: 5-25-17 - today  Future Office visit:   none  Last Note:    Progress Notes      1)POAG -- s/p Trab OD (4/24/17), s/p SLT OD (1/10 and 6/14) -- K pachy:531/562 Tmax: 29/28 HVF: OD:Superior arcuate stable from 4352-8833 with possible progression in 2014 and OD:Sup arcuate with IN dec sens in 2016 and OS:Fluct  CDR:0.85/0.5 HRT/OCT: OD:Mod RNFL thinning with ?prog in 2016 and OS:RNFL WNL FHX of Glc: No Gonio: open Intolerant to: Asthma/COPD: No, tolerating topical BB Steroid Use: Yes Kidney Stones: No Sulfa Allergy: No IOP targets: OD:M-Hteens and OS:Hteens-L20s  --  2)H/O Ch Nevus OS  3)NS OS -- rec OD:Phaco/trab and OS:Phaco/MIGS   4)S/p evacuation of bilateral subdural hematoma 7/2/16 -- H/O CTs and MRI at [facility] in 2016  5)PCIOL OD  6)H/O Bleb Leak OD -- developed after LSL (that area has now sealed with development of new leak temporally at suture site) -- Kontour 16mm 8.6 BCTL placed     S/p LSL of middle suture     MD (done previously):Glare test OD:20/80 and OS:20/60     Patient will continue on Cosopt (Timolol/Dorzolamide) which is a blue top drop 2x/day (12 hours apart) in the left eye only, Latanoprost which is a teal top drop at bedtime in the left eye only, and discontinue and hold onto Alphagan (Brimonidine) which is a purple top drop.  No heavy lifting, bending, stooping, straining, rubbing the eye, or getting water in the eye.  Please wear protective eyewear over the eye at all times including glasses during the day and the protective shield at bedtime.  A long discussion of the risks, benefits, and alternatives including potential treatment and management options were had with patient and a decision was made to proceed with bleb revision in the right eye.       Use the following drops (RIGHT EYE ONLY):  Antibiotic --  Ciprofloxacin: 4x/day      NSAID -- Acular LS (Ketorolac) :  Discontinue     Steroid -- Pred Forte/Prednisolone Acetate:  2x/day     Please be sure to call if you have any decrease in vision, increase in pain, flashing lights, redness, or a lot of drainage.             Provider has already    Kathleen M Doege RN

## 2017-05-25 NOTE — MR AVS SNAPSHOT
After Visit Summary   5/25/2017    Rusty Harris    MRN: [id number]           Patient Information     Date Of Birth          1940        Visit Information        Provider Department      5/25/2017 7:30 AM Glenys Green [facility]        Today's Diagnoses     Primary open angle glaucoma of both eyes, severe stage    -  1      Care Instructions    Patient will continue on Cosopt (Timolol/Dorzolamide) which is a blue top drop 2x/day (12 hours apart) in the left eye only, Latanoprost which is a teal top drop at bedtime in the left eye only, and discontinue and hold onto Alphagan (Brimonidine) which is a purple top drop.  No heavy lifting, bending, stooping, straining, rubbing the eye, or getting water in the eye.  Please wear protective eyewear over the eye at all times including glasses during the day and the protective shield at bedtime.  A long discussion of the risks, benefits, and alternatives including potential treatment and management options were had with patient and a decision was made to proceed with bleb revision in the right eye.      Use the following drops (RIGHT EYE ONLY):  Antibiotic --  Ciprofloxacin: 4x/day     NSAID -- Acular LS (Ketorolac) : Discontinue    Steroid -- Pred Forte/Prednisolone Acetate:  2x/day    Please be sure to call if you have any decrease in vision, increase in pain, flashing lights, redness, or a lot of drainage.            Follow-ups after your visit        Who to contact     Please call your clinic at [phone] to:    Ask questions about your health    Make or cancel appointments    Discuss your medicines    Learn about your test results    Speak to your doctor   If you have compliments or concerns about an experience at your clinic, or if you wish to file a complaint, please contact [facility] Patient Relations at [phone] or email us at [email or web address]         Additional Information About Your  Visit        [employer] Information     [employer] is an electronic gateway that provides easy, online access to your medical records. With [employer] you can request a clinic appointment, read your test results, renew a prescription or communicate with your care team.     To sign up for [employer] visit the website at [email or web address]"   You will be asked to enter the access code listed below, as well as some personal information. Please follow the directions to create your username and password.     Your access code is: [email or web address]  Expires: 2017  2:53 PM     Your access code will  in 90 days. If you need help or a new code, please contact your [facility] or call [phone] for assistance.        Care EveryWhere ID     This is your Care EveryWhere ID. This could be used by other organizations to access your [facility] medical records  [id number]         Blood Pressure from Last 3 Encounters:   No data found for BP    Weight from Last 3 Encounters:   No data found for Wt              Today, you had the following     No orders found for display       Primary Care Provider Office Phone # Fax #    Gustabo Melendez [phone]       [address] [address]        Thank you!     Thank you for choosing [facility]  for your care. Our goal is always to provide you with excellent care. Hearing back from our patients is one way we can continue to improve our services. Please take a few minutes to complete the written survey that you may receive in the mail after your visit with us. Thank you!             Your Updated Medication List - Protect others around you: Learn how to safely use, store and throw away your medicines at www.disposemymeds.org.          This list is accurate as of: 17  9:15 AM.  Always use your most recent med list.                   Brand Name Dispense Instructions for use    acetaZOLAMIDE 500 MG 12  hr capsule    DIAMOX SEQUEL    60 capsule    Take 1 capsule (500 mg) by mouth 2 times daily       brimonidine 0.2 % ophthalmic solution    ALPHAGAN    15 mL    Place 1 drop into the right eye 3 times daily       ciprofloxacin 0.3 % ophthalmic solution    CILOXAN    1 Bottle    Place 1 drop into the right eye 4 times daily       * dorzolamide-timolol 2-0.5 % ophthalmic solution    COSOPT     Place 1 drop Into the left eye 2 times daily       * dorzolamide-timolol 2-0.5 % ophthalmic solution    COSOPT    10 mL    Place 1 drop into both eyes 2 times daily *12 HOURS APART       ketorolac 0.5 % ophthalmic solution    ACULAR    1 Bottle    Place 1 drop into the right eye 3 times daily       * latanoprost 0.005 % ophthalmic solution    XALATAN     Place 1 drop Into the left eye At Bedtime       * latanoprost 0.005 % ophthalmic solution    XALATAN    2.5 mL    Place 1 drop into both eyes At Bedtime       prednisoLONE acetate 1 % ophthalmic susp    PRED FORTE     Place 1 drop Into the left eye 4 times daily       prednisoLONE acetate 1 % ophthalmic susp    PRED FORTE    1 Bottle    Place 1 drop into the right eye every 2 hours       SIMVASTATIN PO          * Notice:  This list has 4 medication(s) that are the same as other medications prescribed for you. Read the directions carefully, and ask your doctor or other care provider to review them with you.

## 2017-05-25 NOTE — NURSING NOTE
Chief Complaints and History of Present Illnesses   Patient presents with     Post Op (Ophthalmology) Right Eye     POAG -- s/p Trab OD (4/24/17), s/p SLT OD (1/10 and 6/14)      HPI    Affected eye(s):  Right   Symptoms:        Duration:  2 weeks   Frequency:  Constant       Do you have eye pain now?:  No      Comments:  Pt. states that he has been seeing some floaters RE for the last 2 days.  No flashes BE.  No c/o comfort BE.  No change in VA BE.  Sera Flowers COT 7:52 AM May 25, 2017

## 2017-05-25 NOTE — PATIENT INSTRUCTIONS
Patient will continue on Cosopt (Timolol/Dorzolamide) which is a blue top drop 2x/day (12 hours apart) in the left eye only, Latanoprost which is a teal top drop at bedtime in the left eye only, and discontinue and hold onto Alphagan (Brimonidine) which is a purple top drop.  No heavy lifting, bending, stooping, straining, rubbing the eye, or getting water in the eye.  Please wear protective eyewear over the eye at all times including glasses during the day and the protective shield at bedtime.  A long discussion of the risks, benefits, and alternatives including potential treatment and management options were had with patient and a decision was made to proceed with bleb revision in the right eye.      Use the following drops (RIGHT EYE ONLY):  Antibiotic --  Ciprofloxacin: 4x/day     NSAID -- Acular LS (Ketorolac) : Discontinue    Steroid -- Pred Forte/Prednisolone Acetate:  2x/day    Please be sure to call if you have any decrease in vision, increase in pain, flashing lights, redness, or a lot of drainage.

## 2017-05-31 ENCOUNTER — TRANSFERRED RECORDS (OUTPATIENT)
Dept: HEALTH INFORMATION MANAGEMENT | Facility: CLINIC | Age: 77
End: 2017-05-31

## 2017-06-06 ENCOUNTER — OFFICE VISIT (OUTPATIENT)
Dept: OPHTHALMOLOGY | Facility: CLINIC | Age: 77
End: 2017-06-06
Attending: OPHTHALMOLOGY
Payer: MEDICARE

## 2017-06-06 DIAGNOSIS — H40.1133 PRIMARY OPEN ANGLE GLAUCOMA OF BOTH EYES, SEVERE STAGE: Primary | ICD-10-CM

## 2017-06-06 PROCEDURE — 99212 OFFICE O/P EST SF 10 MIN: CPT | Mod: ZF

## 2017-06-06 RX ORDER — CIPROFLOXACIN HYDROCHLORIDE 3.5 MG/ML
1 SOLUTION/ DROPS TOPICAL 4 TIMES DAILY
Qty: 1 BOTTLE | Refills: 0 | Status: SHIPPED | OUTPATIENT
Start: 2017-06-06 | End: 2017-09-21

## 2017-06-06 ASSESSMENT — SLIT LAMP EXAM - LIDS
COMMENTS: NORMAL
COMMENTS: NORMAL

## 2017-06-06 ASSESSMENT — TONOMETRY
OD_IOP_MMHG: 7
OS_IOP_MMHG: 13
IOP_METHOD: APPLANATION

## 2017-06-06 ASSESSMENT — EXTERNAL EXAM - LEFT EYE: OS_EXAM: NORMAL

## 2017-06-06 ASSESSMENT — VISUAL ACUITY
METHOD: SNELLEN - LINEAR
OD_SC: 20/100
OS_SC+: -2
OS_SC: 20/40

## 2017-06-06 ASSESSMENT — CUP TO DISC RATIO: OD_RATIO: 0.85

## 2017-06-06 ASSESSMENT — EXTERNAL EXAM - RIGHT EYE: OD_EXAM: NORMAL

## 2017-06-06 NOTE — MR AVS SNAPSHOT
After Visit Summary   6/6/2017    Rusty Harris    MRN: [id number]           Patient Information     Date Of Birth          1940        Visit Information        Provider Department      6/6/2017 7:30 AM Glenys Green MD [facility]        Today's Diagnoses     Primary open angle glaucoma of both eyes, severe stage    -  1      Care Instructions    Patient will continue on Cosopt (Timolol/Dorzolamide) which is a blue top drop 2x/day (12 hours apart) in the left eye only, Latanoprost which is a teal top drop at bedtime in the left eye only, and discontinue and hold onto Alphagan (Brimonidine) which is a purple top drop.  No heavy lifting, bending, stooping, straining, rubbing the eye, or getting water in the eye.  Please wear protective eyewear over the eye at all times including glasses during the day and the protective shield at bedtime.  Patient will return to clinic in 1-2 weeks with repeat evaluation.      Use the following drops (RIGHT EYE ONLY):  Antibiotic --  Ciprofloxacin: 4x/day     NSAID -- Acular LS (Ketorolac) : Discontinue    Steroid -- Pred Forte/Prednisolone Acetate:  Discontinue    Please be sure to call if you have any decrease in vision, increase in pain, flashing lights, redness, or a lot of drainage.            Follow-ups after your visit        Follow-up notes from your care team     Return 2 weeks with repeat evaluation..      Your next 10 appointments already scheduled     Jun 20, 2017  7:30 AM CDT   Post-Op with Glenys Green MD   Eye Clinic ([facility])    Paul Gage [address]  [address]  [facility] [address] [phone]              Who to contact     Please call your clinic at [phone] to:    Ask questions about your health    Make or cancel appointments    Discuss your medicines    Learn about your test results    Speak to your doctor   If you have compliments or concerns about an experience at your clinic, or if  you wish to file a complaint, please contact [facility] Patient Relations at [phone] or email us at [email or web address]         Additional Information About Your Visit        [employer] Information     [employer] is an electronic gateway that provides easy, online access to your medical records. With [employer], you can request a clinic appointment, read your test results, renew a prescription or communicate with your care team.     To sign up for [employer] visit the website at [email or web address]   You will be asked to enter the access code listed below, as well as some personal information. Please follow the directions to create your username and password.     Your access code is: [email or web address]  Expires: 2017  2:53 PM     Your access code will  in 90 days. If you need help or a new code, please contact your [facility] or call [phone] for assistance.        Care EveryWhere ID     This is your Care EveryWhere ID. This could be used by other organizations to access your [place] medical records  [id number]         Blood Pressure from Last 3 Encounters:   No data found for BP    Weight from Last 3 Encounters:   No data found for Wt              Today, you had the following     No orders found for display         Today's Medication Changes          These changes are accurate as of: 17  9:12 AM.  If you have any questions, ask your nurse or doctor.               These medicines have changed or have updated prescriptions.        Dose/Directions    * ciprofloxacin 0.3 % ophthalmic solution   Commonly known as:  CILOXAN   This may have changed:  Another medication with the same name was added. Make sure you understand how and when to take each.   Used for:  Primary open angle glaucoma of both eyes, severe stage   Changed by:  Glenys Green MD        Dose:  1 drop   Place 1 drop into the right eye 4 times daily   Quantity:  1  Bottle   Refills:  0       * ciprofloxacin 0.3 % ophthalmic solution   Commonly known as:  CILOXAN   This may have changed:  You were already taking a medication with the same name, and this prescription was added. Make sure you understand how and when to take each.   Used for:  Primary open angle glaucoma of both eyes, severe stage   Changed by:  Gleyns Green MD        Dose:  1 drop   Place 1 drop into the right eye 4 times daily   Quantity:  1 Bottle   Refills:  0       * Notice:  This list has 2 medication(s) that are the same as other medications prescribed for you. Read the directions carefully, and ask your doctor or other care provider to review them with you.         Where to get your medicines      These medications were sent to [address] [address]     Phone:  [phone]     ciprofloxacin 0.3 % ophthalmic solution                Primary Care Provider Office Phone # Fax #    Gustabo Melendez [phone]       [address] [address]        Thank you!     Thank you for choosing [facility]  for your care. Our goal is always to provide you with excellent care. Hearing back from our patients is one way we can continue to improve our services. Please take a few minutes to complete the written survey that you may receive in the mail after your visit with us. Thank you!             Your Updated Medication List - Protect others around you: Learn how to safely use, store and throw away your medicines at www.disposemymeds.org.          This list is accurate as of: 6/6/17  9:12 AM.  Always use your most recent med list.                   Brand Name Dispense Instructions for use    acetaZOLAMIDE 500 MG 12 hr capsule    DIAMOX SEQUEL    60 capsule    Take 1 capsule (500 mg) by mouth 2 times daily       brimonidine 0.2 % ophthalmic solution    ALPHAGAN    15  mL    Place 1 drop into the right eye 3 times daily       * ciprofloxacin 0.3 % ophthalmic solution    CILOXAN    1 Bottle    Place 1 drop into the right eye 4 times daily       * ciprofloxacin 0.3 % ophthalmic solution    CILOXAN    1 Bottle    Place 1 drop into the right eye 4 times daily       * dorzolamide-timolol 2-0.5 % ophthalmic solution    COSOPT     Place 1 drop Into the left eye 2 times daily       * dorzolamide-timolol 2-0.5 % ophthalmic solution    COSOPT    10 mL    Place 1 drop into both eyes 2 times daily *12 HOURS APART       ketorolac 0.5 % ophthalmic solution    ACULAR    1 Bottle    Place 1 drop into the right eye 3 times daily       * latanoprost 0.005 % ophthalmic solution    XALATAN     Place 1 drop Into the left eye At Bedtime       * latanoprost 0.005 % ophthalmic solution    XALATAN    2.5 mL    Place 1 drop into both eyes At Bedtime       prednisoLONE acetate 1 % ophthalmic susp    PRED FORTE     Place 1 drop Into the left eye 4 times daily       prednisoLONE acetate 1 % ophthalmic susp    PRED FORTE    1 Bottle    Place 1 drop into the right eye every 2 hours       SIMVASTATIN PO          * Notice:  This list has 6 medication(s) that are the same as other medications prescribed for you. Read the directions carefully, and ask your doctor or other care provider to review them with you.

## 2017-06-06 NOTE — NURSING NOTE
Chief Complaints and History of Present Illnesses   Patient presents with     Post Op (Ophthalmology) Right Eye     P/O 10 day RE Trab. / cataract     HPI    Affected eye(s):  Right   Symptoms:     Blurred vision   Decreased vision   Floaters   No flashes   No foreign body sensation   No tearing   No Dryness      Duration:  10 days   Frequency:  Constant       Do you have eye pain now?:  No      Comments:  Pt. stated vision has been blurry over the lats 6 days  Cosopt left eye only 6:30 AM   Latanoprost left eye last used 6:00 AM

## 2017-06-06 NOTE — PROGRESS NOTES
1)POAG -- s/p Bleb Revision OD (5/31/17), s/p Trab OD (4/24/17), s/p SLT OD (1/10 and 6/14) -- K pachy:531/562 Tmax: 29/28 HVF: OD:Superior arcuate stable from 1925-6515 with possible progression in 2014 and OD:Sup arcuate with IN dec sens in 2016 and OS:Fluct  CDR:0.85/0.5 HRT/OCT: OD:Mod RNFL thinning with ?prog in 2016 and OS:RNFL WNL FHX of Glc: No Gonio: open Intolerant to: Asthma/COPD: No, tolerating topical BB Steroid Use: Yes Kidney Stones: No Sulfa Allergy: No IOP targets: OD:M-Hteens and OS:Hteens-L20s  --  2)H/O Ch Nevus OS  3)NS OS -- rec OD:Phaco/trab and OS:Phaco/MIGS   4)S/p evacuation of bilateral subdural hematoma 7/2/16 -- H/O CTs and MRI at [facility] in 2016  5)PCIOL OD  6)H/O Bleb Leak OD s/p Bleb revision -- improved -- 8.8 BCTL placed    S/p LSL of middle suture    MD (done previously):Glare test OD:20/80 and OS:20/60    Patient will continue on Cosopt (Timolol/Dorzolamide) which is a blue top drop 2x/day (12 hours apart) in the left eye only, Latanoprost which is a teal top drop at bedtime in the left eye only, and discontinue and hold onto Alphagan (Brimonidine) which is a purple top drop.  No heavy lifting, bending, stooping, straining, rubbing the eye, or getting water in the eye.  Please wear protective eyewear over the eye at all times including glasses during the day and the protective shield at bedtime.  Patient will return to clinic in 1-2 weeks with repeat evaluation.      Use the following drops (RIGHT EYE ONLY):  Antibiotic --  Ciprofloxacin: 4x/day     NSAID -- Acular LS (Ketorolac) : Discontinue    Steroid -- Pred Forte/Prednisolone Acetate:  Discontinue    Please be sure to call if you have any decrease in vision, increase in pain, flashing lights, redness, or a lot of drainage.

## 2017-06-06 NOTE — PATIENT INSTRUCTIONS
Patient will continue on Cosopt (Timolol/Dorzolamide) which is a blue top drop 2x/day (12 hours apart) in the left eye only, Latanoprost which is a teal top drop at bedtime in the left eye only, and discontinue and hold onto Alphagan (Brimonidine) which is a purple top drop.  No heavy lifting, bending, stooping, straining, rubbing the eye, or getting water in the eye.  Please wear protective eyewear over the eye at all times including glasses during the day and the protective shield at bedtime.  Patient will return to clinic in 1-2 weeks with repeat evaluation.      Use the following drops (RIGHT EYE ONLY):  Antibiotic --  Ciprofloxacin: 4x/day     NSAID -- Acular LS (Ketorolac) : Discontinue    Steroid -- Pred Forte/Prednisolone Acetate:  Discontinue    Please be sure to call if you have any decrease in vision, increase in pain, flashing lights, redness, or a lot of drainage.

## 2017-06-09 ENCOUNTER — TELEPHONE (OUTPATIENT)
Dept: OPHTHALMOLOGY | Facility: CLINIC | Age: 77
End: 2017-06-09

## 2017-06-09 NOTE — TELEPHONE ENCOUNTER
S/p glaucoma surgery 5-31-17 bleb revision-- h/o trabeculectomy    Scratchy sensation, some foreign body sensation  No new redness  More light sensitivities today  More uncomfortable today  Vision about the same  Pt on ciprofloxacin  No artificial tear use   Pt states did golf yesterday-- steffanie day per pt    Recommended preservative free artificial tear use every 1-2 hours-- encouraged preservative free  If has increase in redness, vision starts to worsen, worsening eye pain to call-- reviewed weekend on call protocols  Pt verbally demonstrated understanding and seems comfortable with plan    Note to resident of call for review  Miguel Choudhary RN 10:46 AM 06/09/17

## 2017-06-09 NOTE — TELEPHONE ENCOUNTER
----- Message from Mark Ramirez sent at 6/9/2017  9:34 AM CDT -----  Regarding: Symptomatic Pt Post Glaucoma Surg with Dr. Green  Contact: [phone]  Good Morning Team,    Dr. Green Post Surg Pt called because for the past few days he has had an uncomfortable feelign with his eyes, cant keep his eyes open and when he blinks he gets this scratchy feeling. Please call pt back to discuss symptoms.    Thank you    Mark :)    Please DO NOT send this message and/or reply back to sender.  Call Center Representatives DO NOT respond to messages.

## 2017-06-10 ENCOUNTER — TELEPHONE (OUTPATIENT)
Dept: OPHTHALMOLOGY | Facility: CLINIC | Age: 77
End: 2017-06-10

## 2017-06-13 ENCOUNTER — OFFICE VISIT (OUTPATIENT)
Dept: OPHTHALMOLOGY | Facility: CLINIC | Age: 77
End: 2017-06-13
Attending: OPHTHALMOLOGY
Payer: MEDICARE

## 2017-06-13 DIAGNOSIS — Z98.890 POSTOPERATIVE EYE STATE: ICD-10-CM

## 2017-06-13 DIAGNOSIS — T81.31XA LEAKING OF CONJUNCTIVAL DRAINAGE BLEB: ICD-10-CM

## 2017-06-13 DIAGNOSIS — T15.01XS: Primary | ICD-10-CM

## 2017-06-13 DIAGNOSIS — H59.89 LEAKING OF CONJUNCTIVAL DRAINAGE BLEB: ICD-10-CM

## 2017-06-13 PROCEDURE — 99213 OFFICE O/P EST LOW 20 MIN: CPT | Mod: ZF

## 2017-06-13 ASSESSMENT — REFRACTION_WEARINGRX
OD_ADD: +2.75
OD_CYLINDER: +1.25
OS_SPHERE: -0.50
OD_AXIS: 008
OD_SPHERE: -0.50
OS_CYLINDER: +1.50
OS_AXIS: 010
OS_ADD: +2.75

## 2017-06-13 ASSESSMENT — VISUAL ACUITY
OS_SC: 20/40
METHOD: SNELLEN - LINEAR
OS_SC+: +2
OD_SC+: +2
OD_SC: 20/40

## 2017-06-13 ASSESSMENT — SLIT LAMP EXAM - LIDS
COMMENTS: NORMAL
COMMENTS: NORMAL

## 2017-06-13 ASSESSMENT — PACHYMETRY
OD_CT(UM): 532
OS_CT(UM): 533

## 2017-06-13 ASSESSMENT — TONOMETRY
OD_IOP_MMHG: 13
IOP_METHOD: TONOPEN
OS_IOP_MMHG: 18

## 2017-06-13 ASSESSMENT — EXTERNAL EXAM - LEFT EYE: OS_EXAM: NORMAL

## 2017-06-13 ASSESSMENT — CUP TO DISC RATIO: OD_RATIO: 0.85

## 2017-06-13 ASSESSMENT — EXTERNAL EXAM - RIGHT EYE: OD_EXAM: NORMAL

## 2017-06-13 NOTE — PATIENT INSTRUCTIONS
A bandage contact lens has been replaced in your right eye  While the lens in place, continue using the antibiotic ciprofloxacin (Tan Cap) 4 times a day in you right eye  Begin using your dorzolamide-timolol/Cosopt (Blue Cap) 1 drop 1 time daily in the morning right eye    Continue your glaucoma drops in the left eye. Latanoprost/Xalatan (Turqoise Cap) 1 drop 1 time daily and dorzolamide-timolol/Cosopt (Blue Cap) 1 drop 2 times daily.     Return in 2-3 weeks with Dr. Green.    If you have any increasing pain, redness, or decreasing vision; call the eye clinic immediately [phone]. If after hours, call [phone] and ask for the ophthalmology resident on call.

## 2017-06-13 NOTE — NURSING NOTE
Chief Complaints and History of Present Illnesses   Patient presents with     Post Op (Ophthalmology) Right Eye     increased pain     HPI    Symptoms:     Foreign body sensation   Photophobia         Do you have eye pain now?:  Yes   Location:  OD   Pain Duration:  4 days      Comments:  POP patient who has eye pain that started last week.  The patient's right eye feels best if it is closed.  He could hardly open his right eye this weekend.  KATHRYN Pendleton 7:31 AM 06/13/2017

## 2017-06-13 NOTE — PROGRESS NOTES
CC: Foreign Body Sensation, Right Eye    HPI: Rusty Harris is a 76 year old male with history of primary open angle glaucoma s/p trabeculectomy 4/24/17 and bleb revision 5/31/17 for bleb leak. Last visit with Dr. Green was 6/6.    The patient says this weekend he developed a foreign body sensation in the eye. He used artificial tears and it feels somewhat better.     POHx:  POAG s/p trabeculectomy OD 4/24/17 & revision 5/31/17  CE/IOL OD  Cataract OS  Choroidal Nevus OS  Bilateral Subdural Hematoma 7/2/16 s/p evacuation at [facility]    Current Eye Medications:   Ciprofloxacin QID OD    Assessment & Plan   Rusty Harris is a 76 year old male with the following diagnoses:     1. Foreign Body Sensation, Right Eye   - the patient's bandage contact lens has fallen out and he is feeling the sutures from his bleb revision   - suture he feels the most is inferotemporal on the cornea (cataract wound), this was removed today at the slit lamp   - bandage contact lens replaced    2. Bleb Leak, Right Eye   - central leak has resolved however to original areas of leak of re-opened   - continue bandage contact lens for tamponade    - continue ciprofloxacin QID for prophylaxis   - will assess need for mattress suture versus bleb revision at next visit   - counseled to call immediately if he has increasing redness, pain, or decreased vision    Return 2-3 weeks with Dr. Green,    Seen and discussed with Dr. Norma Sagastume MD PGY-3  Ophthalmology        Not seen by staff during this visit, available should need have arisen.  Plan appropriate as above.    Clovis Mckeon MD  , Comprehensive Ophthalmology  Department of Ophthalmology and Visual Neurosciences  [facility]

## 2017-06-13 NOTE — TELEPHONE ENCOUNTER
Patient calls to report he has scratchy sensation in his eye. He reports improvement after using preservative free artificial tears overnight. However, when he woke up, he had recurrence of symptoms.  Sensation is worse when he opens his eyes to read. Patient denies vision changes, discharge, red eye, aching sensation. Recommend continuing artificial tears, possible irritation after laser suture removal. May use artificial tear ointment as well. Offered to have patient be seen next week if symptoms are persistent.    Kerwin Velazquez MD

## 2017-06-29 ENCOUNTER — OFFICE VISIT (OUTPATIENT)
Dept: OPHTHALMOLOGY | Facility: CLINIC | Age: 77
End: 2017-06-29
Attending: OPHTHALMOLOGY
Payer: MEDICARE

## 2017-06-29 DIAGNOSIS — H40.1133 PRIMARY OPEN ANGLE GLAUCOMA OF BOTH EYES, SEVERE STAGE: Primary | ICD-10-CM

## 2017-06-29 PROCEDURE — 99213 OFFICE O/P EST LOW 20 MIN: CPT | Mod: ZF

## 2017-06-29 ASSESSMENT — TONOMETRY
IOP_METHOD: APPLANATION
OS_IOP_MMHG: 24
OD_IOP_MMHG: 26
IOP_METHOD: APPLANATION
IOP_METHOD: TONOPEN
OD_IOP_MMHG: 26
OD_IOP_MMHG: 20

## 2017-06-29 ASSESSMENT — VISUAL ACUITY
METHOD: SNELLEN - LINEAR
OS_SC: 20/40
OD_SC+: +/-
OS_PH_SC: 20/40+
OD_SC: 20/40

## 2017-06-29 ASSESSMENT — EXTERNAL EXAM - RIGHT EYE: OD_EXAM: NORMAL

## 2017-06-29 ASSESSMENT — SLIT LAMP EXAM - LIDS
COMMENTS: NORMAL
COMMENTS: NORMAL

## 2017-06-29 ASSESSMENT — CUP TO DISC RATIO: OD_RATIO: 0.85

## 2017-06-29 ASSESSMENT — EXTERNAL EXAM - LEFT EYE: OS_EXAM: NORMAL

## 2017-06-29 NOTE — PATIENT INSTRUCTIONS
Patient will continue on Cosopt (Timolol/Dorzolamide) which is a blue top drop 2x/day (12 hours apart) in BOTH EYES, Latanoprost which is a teal top drop at bedtime in BOTH EYES, and discontinue and hold onto Alphagan (Brimonidine) which is a purple top drop.  No heavy lifting, bending, stooping, straining, rubbing the eye, or getting water in the eye.  Please wear protective eyewear over the eye at all times including glasses during the day and the protective shield at bedtime.  Patient will return to clinic in 3-4 weeks with repeat evaluation and refraction for RIGHT EYE ONLY.

## 2017-06-29 NOTE — MR AVS SNAPSHOT
After Visit Summary   6/29/2017    Rusty Harris    MRN: [id number]           Patient Information     Date Of Birth          1940        Visit Information        Provider Department      6/29/2017 7:30 AM Glenys Green MD [facility]        Today's Diagnoses     Primary open angle glaucoma of both eyes, severe stage    -  1      Care Instructions    Patient will continue on Cosopt (Timolol/Dorzolamide) which is a blue top drop 2x/day (12 hours apart) in BOTH EYES, Latanoprost which is a teal top drop at bedtime in BOTH EYES, and discontinue and hold onto Alphagan (Brimonidine) which is a purple top drop.  No heavy lifting, bending, stooping, straining, rubbing the eye, or getting water in the eye.  Please wear protective eyewear over the eye at all times including glasses during the day and the protective shield at bedtime.  Patient will return to clinic in 3-4 weeks with repeat evaluation and refraction for RIGHT EYE ONLY.          Follow-ups after your visit        Follow-up notes from your care team     Return 3-4 weeks with repeat evaluation and refraction for RIGHT EYE ONLY..      Your next 10 appointments already scheduled     Aug 03, 2017  8:00 AM CDT   RETURN GLAUCOMA with Glenys Green MD   Eye Clinic ([facility])    [facility]  [address]  [address] [address]  [facility] [address] [phone]              Who to contact     Please call your clinic at [phone] to:    Ask questions about your health    Make or cancel appointments    Discuss your medicines    Learn about your test results    Speak to your doctor   If you have compliments or concerns about an experience at your clinic, or if you wish to file a complaint, please contact [facility] Patient Relations at [phone] or email us at [email or web address]         Additional Information About Your Visit        MyChart Information     [employer] is an  electronic gateway that provides easy, online access to your medical records. With [employer], you can request a clinic appointment, read your test results, renew a prescription or communicate with your care team.     To sign up for [employer] visit the website at [email or web address]   You will be asked to enter the access code listed below, as well as some personal information. Please follow the directions to create your username and password.     Your access code is: [email or web address]  Expires: 2017  2:53 PM     Your access code will  in 90 days. If you need help or a new code, please contact your [facility] or call [phone] for assistance.        Care EveryWhere ID     This is your Care EveryWhere ID. This could be used by other organizations to access your [place] medical records  [id number]         Blood Pressure from Last 3 Encounters:   No data found for BP    Weight from Last 3 Encounters:   No data found for Wt              Today, you had the following     No orders found for display         Today's Medication Changes          These changes are accurate as of: 17  9:37 AM.  If you have any questions, ask your nurse or doctor.               These medicines have changed or have updated prescriptions.        Dose/Directions    dorzolamide-timolol 2-0.5 % ophthalmic solution   Commonly known as:  COSOPT   This may have changed:  Another medication with the same name was removed. Continue taking this medication, and follow the directions you see here.   Used for:  Primary open angle glaucoma of both eyes, moderate stage   Changed by:  Glenys Green MD        Dose:  1 drop   Place 1 drop into both eyes 2 times daily *12 HOURS APART   Quantity:  10 mL   Refills:  2       latanoprost 0.005 % ophthalmic solution   Commonly known as:  XALATAN   This may have changed:  Another medication with the same name was removed. Continue taking this medication, and  follow the directions you see here.   Used for:  Primary open angle glaucoma of both eyes, moderate stage   Changed by:  Glenys Green MD        Dose:  1 drop   Place 1 drop into both eyes At Bedtime   Quantity:  2.5 mL   Refills:  11         Stop taking these medicines if you haven't already. Please contact your care team if you have questions.     prednisoLONE acetate 1 % ophthalmic susp   Commonly known as:  PRED FORTE   Stopped by:  Glenys Green MD                    Primary Care Provider Office Phone # Fax #    Gustabo Melendez [phone]       [address] [address]        Equal Access to Services     [facility] AH: Hadii tamera ku hadasho Soomaali, waaxda luqadaha, qaybta kaalmada adeegyada, jackie watters . So [facility] [phone].    ATENCIÓN: Si habla español, tiene a jacob disposición servicios gratuitos de asistencia lingüística. LlAdena Health System [phone].    We comply with applicable federal civil rights laws and [place] laws. We do not discriminate on the basis of race, color, national origin, age, disability sex, sexual orientation or gender identity.            Thank you!     Thank you for choosing EYE CLINIC  for your care. Our goal is always to provide you with excellent care. Hearing back from our patients is one way we can continue to improve our services. Please take a few minutes to complete the written survey that you may receive in the mail after your visit with us. Thank you!             Your Updated Medication List - Protect others around you: Learn how to safely use, store and throw away your medicines at www.disposemymeds.org.          This list is accurate as of: 6/29/17  9:37 AM.  Always use your most recent med list.                   Brand Name Dispense Instructions for use Diagnosis    acetaZOLAMIDE 500 MG 12 hr capsule    DIAMOX SEQUEL    60 capsule    Take 1 capsule (500 mg) by mouth 2 times  daily    Primary open angle glaucoma of both eyes, severe stage       brimonidine 0.2 % ophthalmic solution    ALPHAGAN    15 mL    Place 1 drop into the right eye 3 times daily    Primary open angle glaucoma of both eyes, moderate stage       * ciprofloxacin 0.3 % ophthalmic solution    CILOXAN    1 Bottle    Place 1 drop into the right eye 4 times daily    Primary open angle glaucoma of both eyes, severe stage       * ciprofloxacin 0.3 % ophthalmic solution    CILOXAN    1 Bottle    Place 1 drop into the right eye 4 times daily    Primary open angle glaucoma of both eyes, severe stage       dorzolamide-timolol 2-0.5 % ophthalmic solution    COSOPT    10 mL    Place 1 drop into both eyes 2 times daily *12 HOURS APART    Primary open angle glaucoma of both eyes, moderate stage       ketorolac 0.5 % ophthalmic solution    ACULAR    1 Bottle    Place 1 drop into the right eye 3 times daily    Primary open angle glaucoma of both eyes, severe stage       latanoprost 0.005 % ophthalmic solution    XALATAN    2.5 mL    Place 1 drop into both eyes At Bedtime    Primary open angle glaucoma of both eyes, moderate stage       prednisoLONE acetate 1 % ophthalmic susp    PRED FORTE    1 Bottle    Place 1 drop into the right eye every 2 hours    Primary open angle glaucoma of both eyes, severe stage       SIMVASTATIN PO           * Notice:  This list has 2 medication(s) that are the same as other medications prescribed for you. Read the directions carefully, and ask your doctor or other care provider to review them with you.

## 2017-06-29 NOTE — NURSING NOTE
Chief Complaints and History of Present Illnesses   Patient presents with     Follow Up For      s/p Bleb Revision OD (5/31/17)     HPI    Affected eye(s):  Right   Symptoms:     Blurred vision   No decreased vision         Do you have eye pain now?:  No      Comments:  Bottom half of his vision in the RE is foggy.  Catherine Brambila COA 7:41 AM June 29, 2017

## 2017-07-25 ENCOUNTER — TELEPHONE (OUTPATIENT)
Dept: OPHTHALMOLOGY | Facility: CLINIC | Age: 77
End: 2017-07-25

## 2017-07-25 NOTE — TELEPHONE ENCOUNTER
----- Message from Rosemary Reed sent at 7/25/2017 11:27 AM CDT -----  Regarding: pt request- Norma  Contact: [phone]  Pt requesting to be seen sooner than appt on 8/3/2017 with Dr. Green. Pt stated he is experiencing soreness and light sensitivity in his R eye.    Pt can be reached at number above.    Thanks!- lel    Please DO NOT send this message and/or reply back to sender.  Call Center Representatives DO NOT respond to messages.

## 2017-07-25 NOTE — TELEPHONE ENCOUNTER
Right eye fluctuations in pain/irritation  Yesterday uncomfortable/photophobic and today ok  Right eye foggy-- unchanging  Not improving  Some redness  Sometimes tenderness to touch  Been going on for one month-- pt thought would have improved  Moved appt up to this thursay-- pt may use preservative free artificial tears  Pt to call for worsening symptoms  Miguel Choudhary RN 12:16 PM 07/25/17

## 2017-07-27 ENCOUNTER — OFFICE VISIT (OUTPATIENT)
Dept: OPHTHALMOLOGY | Facility: CLINIC | Age: 77
End: 2017-07-27
Attending: OPHTHALMOLOGY
Payer: MEDICARE

## 2017-07-27 DIAGNOSIS — H40.1133 PRIMARY OPEN ANGLE GLAUCOMA OF BOTH EYES, SEVERE STAGE: Primary | ICD-10-CM

## 2017-07-27 PROCEDURE — 99213 OFFICE O/P EST LOW 20 MIN: CPT | Mod: ZF

## 2017-07-27 PROCEDURE — 92015 DETERMINE REFRACTIVE STATE: CPT | Mod: 52,ZF

## 2017-07-27 RX ORDER — PREDNISOLONE ACETATE 10 MG/ML
1 SUSPENSION/ DROPS OPHTHALMIC 4 TIMES DAILY
Qty: 1 BOTTLE | Refills: 3 | Status: SHIPPED | OUTPATIENT
Start: 2017-07-27 | End: 2017-09-21

## 2017-07-27 ASSESSMENT — TONOMETRY
IOP_METHOD: APPLANATION
OD_IOP_MMHG: 12
OS_IOP_MMHG: 18
OS_IOP_MMHG: 20
OD_IOP_MMHG: 14
OD_IOP_MMHG: 13
OS_IOP_MMHG: 22
IOP_METHOD: TONOPEN
IOP_METHOD: TONOPEN

## 2017-07-27 ASSESSMENT — VISUAL ACUITY
OS_PH_SC: 20/25-1
OS_SC+: -1+1
METHOD: SNELLEN - LINEAR
OS_SC: 20/40
OD_SC+: -1+1
OD_SC: 20/50

## 2017-07-27 ASSESSMENT — EXTERNAL EXAM - LEFT EYE: OS_EXAM: NORMAL

## 2017-07-27 ASSESSMENT — SLIT LAMP EXAM - LIDS
COMMENTS: PTOSIS
COMMENTS: NORMAL

## 2017-07-27 ASSESSMENT — CUP TO DISC RATIO: OD_RATIO: 0.85

## 2017-07-27 ASSESSMENT — REFRACTION_MANIFEST
OD_ADD: +2.75
OD_AXIS: 161
OD_CYLINDER: +2.25
OD_SPHERE: -1.25

## 2017-07-27 ASSESSMENT — EXTERNAL EXAM - RIGHT EYE: OD_EXAM: NORMAL

## 2017-07-27 NOTE — NURSING NOTE
Chief Complaints and History of Present Illnesses   Patient presents with     Glaucoma Follow Up     s/p Bleb Revision OD (5/31/17)     HPI    Affected eye(s):  Right   Symptoms:     Blurred vision (Comment: right eye)   Floaters (Comment: black floaters occasionally, seem to be stable)   No flashes   Foreign body sensation (Comment: feels scratchy when closes lid sometimes)      Duration:  3 weeks      Do you have eye pain now?:  No      Comments:  Pt here for 3 week f/u; s/p Bleb Revision OD (5/31/17)  Pt states vision doesn't get any better  Some days, pt states the eye is so uncomfortable, he has difficulty keeping it open    Ocular meds:  Cosopt 2x/day in both eyes  Latanoprost at bedtime in both eyes    Catherine Wood COA 7:41 AM July 27, 2017

## 2017-07-27 NOTE — MR AVS SNAPSHOT
After Visit Summary   7/27/2017    Rusty Harris    MRN: [id number]           Patient Information     Date Of Birth          1940        Visit Information        Provider Department      7/27/2017 7:30 AM Glenys Green MD [facility]        Today's Diagnoses     Primary open angle glaucoma of both eyes, severe stage    -  1      Care Instructions    Patient will continue on Cosopt (Timolol/Dorzolamide) which is a blue top drop 2x/day (12 hours apart) in BOTH EYES, Latanoprost which is a teal top drop at bedtime in BOTH EYES, and discontinue and hold onto Alphagan (Brimonidine) which is a purple top drop.    Patient will return to clinic in 1-2 weeks with repeat evaluation. Patient will start Prednisolone 4x/day for 1 week then 3x/day until seen.          Follow-ups after your visit        Follow-up notes from your care team     Return 1-2 weeks with repeat evaluation.      Who to contact     Please call your clinic at [phone] to:    Ask questions about your health    Make or cancel appointments    Discuss your medicines    Learn about your test results    Speak to your doctor   If you have compliments or concerns about an experience at your clinic, or if you wish to file a complaint, please contact [facility] Patient Relations at [phone] or email us at [email or web address]         Additional Information About Your Visit        Ryzinghart Information     [employer] is an electronic gateway that provides easy, online access to your medical records. With [employer], you can request a clinic appointment, read your test results, renew a prescription or communicate with your care team.     To sign up for [employer] visit the website at [email or web address]   You will be asked to enter the access code listed below, as well as some personal information. Please follow the directions to create your username and password.     Your access code is:  [email or web address]  Expires: 2017  2:53 PM     Your access code will  in 90 days. If you need help or a new code, please contact your [facility] or call [phone] for assistance.        Care EveryWhere ID     This is your Care EveryWhere ID. This could be used by other organizations to access your [place] medical records  [id number]         Blood Pressure from Last 3 Encounters:   No data found for BP    Weight from Last 3 Encounters:   No data found for Wt              Today, you had the following     No orders found for display         Today's Medication Changes          These changes are accurate as of: 17  9:30 AM.  If you have any questions, ask your nurse or doctor.               Start taking these medicines.        Dose/Directions    prednisoLONE acetate 1 % ophthalmic susp   Commonly known as:  PRED FORTE   Used for:  Primary open angle glaucoma of both eyes, severe stage   Started by:  Glenys Green MD        Dose:  1 drop   Place 1 drop into the right eye 4 times daily   Quantity:  1 Bottle   Refills:  3            Where to get your medicines      These medications were sent to [address] [address]     Phone:  [phone]     prednisoLONE acetate 1 % ophthalmic susp                Primary Care Provider Office Phone # Fax #    Gustabo Melendez [phone]       [address] [address]        Equal Access to Services     HELENE [facility]RAH[facility] AH: Hadii tamera ku hadasho Soomaali, waaxda luqadaha, qaybta kaalmada adeegyada, jackie browning. So [facility] [phone].    ATENCIÓN: Si habla español, tiene a jacob disposición servicios gratuitos de asistencia lingüística. Llame al [phone].    We comply with applicable federal civil rights laws and [place] laws. We do not discriminate on the basis  of race, color, national origin, age, disability sex, sexual orientation or gender identity.            Thank you!     Thank you for choosing EYE CLINIC  for your care. Our goal is always to provide you with excellent care. Hearing back from our patients is one way we can continue to improve our services. Please take a few minutes to complete the written survey that you may receive in the mail after your visit with us. Thank you!             Your Updated Medication List - Protect others around you: Learn how to safely use, store and throw away your medicines at www.disposemymeds.org.          This list is accurate as of: 7/27/17  9:30 AM.  Always use your most recent med list.                   Brand Name Dispense Instructions for use Diagnosis    acetaZOLAMIDE 500 MG 12 hr capsule    DIAMOX SEQUEL    60 capsule    Take 1 capsule (500 mg) by mouth 2 times daily    Primary open angle glaucoma of both eyes, severe stage       brimonidine 0.2 % ophthalmic solution    ALPHAGAN    15 mL    Place 1 drop into the right eye 3 times daily    Primary open angle glaucoma of both eyes, moderate stage       * ciprofloxacin 0.3 % ophthalmic solution    CILOXAN    1 Bottle    Place 1 drop into the right eye 4 times daily    Primary open angle glaucoma of both eyes, severe stage       * ciprofloxacin 0.3 % ophthalmic solution    CILOXAN    1 Bottle    Place 1 drop into the right eye 4 times daily    Primary open angle glaucoma of both eyes, severe stage       dorzolamide-timolol 2-0.5 % ophthalmic solution    COSOPT    10 mL    Place 1 drop into both eyes 2 times daily *12 HOURS APART    Primary open angle glaucoma of both eyes, moderate stage       ketorolac 0.5 % ophthalmic solution    ACULAR    1 Bottle    Place 1 drop into the right eye 3 times daily    Primary open angle glaucoma of both eyes, severe stage       latanoprost 0.005 % ophthalmic solution    XALATAN    2.5 mL    Place 1 drop into both eyes At Bedtime    Primary  open angle glaucoma of both eyes, moderate stage       prednisoLONE acetate 1 % ophthalmic susp    PRED FORTE    1 Bottle    Place 1 drop into the right eye every 2 hours    Primary open angle glaucoma of both eyes, severe stage       prednisoLONE acetate 1 % ophthalmic susp    PRED FORTE    1 Bottle    Place 1 drop into the right eye 4 times daily    Primary open angle glaucoma of both eyes, severe stage       SIMVASTATIN PO           * Notice:  This list has 2 medication(s) that are the same as other medications prescribed for you. Read the directions carefully, and ask your doctor or other care provider to review them with you.

## 2017-07-27 NOTE — PATIENT INSTRUCTIONS
Patient will continue on Cosopt (Timolol/Dorzolamide) which is a blue top drop 2x/day (12 hours apart) in BOTH EYES, Latanoprost which is a teal top drop at bedtime in BOTH EYES, and discontinue and hold onto Alphagan (Brimonidine) which is a purple top drop.    Patient will return to clinic in 1-2 weeks with repeat evaluation. Patient will start Prednisolone 4x/day for 1 week then 3x/day until seen.

## 2017-07-27 NOTE — PROGRESS NOTES
1)POAG -- s/p Bleb Revision OD (5/31/17), s/p Trab OD (4/24/17), s/p SLT OD (1/10 and 6/14) -- K pachy:531/562 Tmax: 29/28 HVF: OD:Superior arcuate stable from 6493-0971 with possible progression in 2014 and OD:Sup arcuate with IN dec sens in 2016 and OS:Fluct  CDR:0.85/0.5 HRT/OCT: OD:Mod RNFL thinning with ?prog in 2016 and OS:RNFL WNL FHX of Glc: No Gonio: open Intolerant to: Asthma/COPD: No, tolerating topical BB Steroid Use: Yes Kidney Stones: No Sulfa Allergy: No IOP targets: OD:M-Hteens and OS:Hteens-L20s  --  2)H/O Ch Nevus OS  3)NS OS -- rec OD:Phaco/trab and OS:Phaco/MIGS   4)S/p evacuation of bilateral subdural hematoma 7/2/16 -- H/O CTs and MRI at [facility] in 2016  5)PCIOL OD  6)H/O Bleb Leak OD s/p Bleb revision -- resolved with BCTL -- remains joaquina negative  7)Uveitis OD -- ?rebound inflammation -- not c/w blebitis    S/p LSL of all sutures    MD (done previously):Glare test OD:20/80 and OS:20/60    Patient will continue on Cosopt (Timolol/Dorzolamide) which is a blue top drop 2x/day (12 hours apart) in BOTH EYES, Latanoprost which is a teal top drop at bedtime in BOTH EYES, and discontinue and hold onto Alphagan (Brimonidine) which is a purple top drop.    Patient will return to clinic in 1-2 weeks with repeat evaluation. Patient will start Prednisolone 4x/day for 1 week then 3x/day until seen.      Resident Note (Please Follow Final Note Above)  S: Here for repeat refraction right eye and eval of right eye fluctuations in blurry vision, photophobia, and irritation in the right eye x 3 days. Right eye feels comfortable while closed. Vision clears with blinking. Good drop compliance. Left eye stable.   O: IOP good and bleb joaquina negative right eye but injection and AC inflammation present.  A/P: ?source of inflammation. Joaquina negative      Attending Physician Attestation:  Complete documentation of historical and exam elements from today's encounter can be found in the full encounter summary report  (not reduplicated in this progress note). I personally obtained the chief complaint(s) and history of present illness.  I confirmed and edited as necessary the review of systems, past medical/surgical history, family history, social history, and examination findings as documented by others; and I examined the patient myself. I personally reviewed the relevant tests, images, and reports as documented above. I formulated and edited as necessary the assessment and plan and discussed the findings and management plan with the patient and family.  - Glenys Green MD

## 2017-08-04 DIAGNOSIS — H40.1132 PRIMARY OPEN ANGLE GLAUCOMA OF BOTH EYES, MODERATE STAGE: ICD-10-CM

## 2017-08-04 RX ORDER — DORZOLAMIDE HYDROCHLORIDE AND TIMOLOL MALEATE 20; 5 MG/ML; MG/ML
1 SOLUTION/ DROPS OPHTHALMIC EVERY 12 HOURS
Qty: 3 BOTTLE | Refills: 1 | Status: SHIPPED | OUTPATIENT
Start: 2017-08-04 | End: 2018-10-02

## 2017-08-04 NOTE — TELEPHONE ENCOUNTER
dorzolamide-timolol (COSOPT) 2-0.5 % ophthalmic solution      Last Written Prescription Date:  1-30-17  Last Fill Quantity: 10 ml,   # refills: 2  Last Office Visit: 7-27-17  Future Office visit:   8-8-17  Last Note:  Patient will continue on Cosopt (Timolol/Dorzolamide) which is a blue top drop 2x/day (12 hours apart) in BOTH EYES, Latanoprost which is a teal top drop at bedtime in BOTH EYES, and discontinue and hold onto Alphagan (Brimonidine) which is a purple top drop.    Patient will return to clinic in 1-2 weeks with repeat evaluation. Patient will start Prednisolone 4x/day for 1 week then 3x/day until seen.          Kathleen M Doege RN

## 2017-08-08 ENCOUNTER — OFFICE VISIT (OUTPATIENT)
Dept: OPHTHALMOLOGY | Facility: CLINIC | Age: 77
End: 2017-08-08
Attending: OPHTHALMOLOGY
Payer: MEDICARE

## 2017-08-08 DIAGNOSIS — H01.00A BLEPHARITIS OF UPPER AND LOWER EYELIDS OF BOTH EYES, UNSPECIFIED TYPE: ICD-10-CM

## 2017-08-08 DIAGNOSIS — H02.889 MGD (MEIBOMIAN GLAND DYSFUNCTION): ICD-10-CM

## 2017-08-08 DIAGNOSIS — H01.00B BLEPHARITIS OF UPPER AND LOWER EYELIDS OF BOTH EYES, UNSPECIFIED TYPE: ICD-10-CM

## 2017-08-08 DIAGNOSIS — H04.129 DRY EYE: ICD-10-CM

## 2017-08-08 DIAGNOSIS — H20.9 ANTERIOR UVEITIS: Primary | ICD-10-CM

## 2017-08-08 DIAGNOSIS — H40.1133 PRIMARY OPEN ANGLE GLAUCOMA OF BOTH EYES, SEVERE STAGE: ICD-10-CM

## 2017-08-08 PROCEDURE — 99213 OFFICE O/P EST LOW 20 MIN: CPT | Mod: ZF

## 2017-08-08 RX ORDER — KETOROLAC TROMETHAMINE 5 MG/ML
1 SOLUTION OPHTHALMIC 3 TIMES DAILY
Qty: 1 BOTTLE | Refills: 0 | Status: CANCELLED | OUTPATIENT
Start: 2017-08-08

## 2017-08-08 ASSESSMENT — VISUAL ACUITY
OS_SC: 20/40
OD_SC+: +1
OD_SC: 20/40
METHOD: SNELLEN - LINEAR
OS_SC+: -1
OS_PH_SC: 20/30-2

## 2017-08-08 ASSESSMENT — EXTERNAL EXAM - LEFT EYE: OS_EXAM: NORMAL

## 2017-08-08 ASSESSMENT — TONOMETRY
OD_IOP_MMHG: 22
IOP_METHOD: APPLANATION
OS_IOP_MMHG: 21
OS_IOP_MMHG: 22
OD_IOP_MMHG: 22
IOP_METHOD: APPLANATION

## 2017-08-08 ASSESSMENT — EXTERNAL EXAM - RIGHT EYE: OD_EXAM: NORMAL

## 2017-08-08 ASSESSMENT — SLIT LAMP EXAM - LIDS: COMMENTS: MGD, BLEPHARITIS

## 2017-08-08 NOTE — PROGRESS NOTES
CC: recheck AC cell OD (Dr. Green pt)    HPI: Rusty Harris is a 76 year old male with hx of POAG s/p trab OD with hx of bleb leak OD s/p bleb revision who presents for recheck of AC inflammation. Last visit was 7/27 with Dr. Green and at that time pt was complaining of blurry vision, photophobia, and irritation in the right eye and mild AC inflammation was noted in the right eye. The bleb was joaquina negative and the cause of the AC inflammation was thought to be due to rebound uveitis. Pt was started on prednisolone 4x daily in the right eye and continues on 3x daily currently.     Today, pt reports vision in both eyes is foggy in the morning and then clears up throughout the day. Also states he has been out of cosopt for 4 days - has been taking alphagan daily OU instead. Denies eye pain or photosensitivity and feels redness is improved.     POHx:  Bleb Revision OD (5/31/17)  Trab OD (4/24/17)  SLT OD (1/10 and 6/14)    Current eye meds:  Pred forte 3x/day OD  Latanoprost qhs OU  (has been out x4 days)  Alphagan daily OU (started 4d ago after running out of cosopt)      Assessment & Plan   Rusty Harris is a 76 year old male with the following diagnoses:   1. Anterior uveitis    2. Primary open angle glaucoma of both eyes, severe stage    3. Blepharitis of upper and lower eyelids of both eyes, unspecified type    4. Dry eye    5. MGD (meibomian gland dysfunction)       No AC inflammation today on pred forte 3x/day OD. Bleb continues to be joaquina negative. Source of inflammation is thought to be rebound inflammation. Taper pred forte - continue 3x/day for remainder of the week, then 2x/day for 1 week, then daily for 1 week, then stop.    IOP mildly elevated in both eyes today, likely due to being off cosopt x4 days and starting prednisolone drops in the right eye a few weeks ago. Pt has refilled cosopt and will restart it 2x/day in both eyes today. Ok to stop alphagan for now.    JAIRO/MGD/blepharitis, both  eyes:  Likely contributing to fluctuating blurry vision. Small amount of inferior SS on right side but no lagophthalmos.   Start ATs 3-4x/day both eyes   Start gel tear in both eyes at bedtime   Start warm compresses 1-2x/day    Recheck with Dr. Green in 4 weeks, sooner prn    Seen with Dr. Mike Pinedo MD   PGY-3 Ophthalmology    Attending Physician Attestation:  Complete documentation of historical and exam elements from today's encounter can be found in the full encounter summary report (not reduplicated in this progress note).  I personally obtained the chief complaint(s) and history of present illness.  I confirmed and edited as necessary the review of systems, past medical/surgical history, family history, social history, and examination findings as documented by others; and I examined the patient myself.  I personally reviewed the relevant tests, images, and reports as documented above.  I formulated and edited as necessary the assessment and plan and discussed the findings and management plan with the patient and family. . - Clovis Mckeon MD

## 2017-08-08 NOTE — PATIENT INSTRUCTIONS
Prednisolone - continue 3x/day for the rest of this week, then 2x/day for 1 week, then daily for 1 week, then stop    Start artificial tears 3-4x/day in both eyes  Start gel tear in both eyes at bedtime  Start warm compresses 1-2x/day

## 2017-08-08 NOTE — MR AVS SNAPSHOT
After Visit Summary   8/8/2017    Rusty Harris    MRN: [id number]           Patient Information     Date Of Birth          1940        Visit Information        Provider Department      8/8/2017 7:30 AM Ni Pinedo [facility]        Today's Diagnoses     Anterior uveitis    -  1    Primary open angle glaucoma of both eyes, severe stage          Care Instructions    Prednisolone - continue 3x/day for the rest of this week, then 2x/day for 1 week, then daily for 1 week, then stop    Start artificial tears 3-4x/day in both eyes  Start gel tear in both eyes at bedtime  Start warm compresses 1-2x/day          Follow-ups after your visit        Follow-up notes from your care team     Return in about 4 weeks (around 9/5/2017) for recheck with Dr. Green.      Your next 10 appointments already scheduled     Sep 07, 2017 10:30 AM CDT   Post-Op with Glenys Green MD   Eye Clinic ([facility])    [facility]  [address]  [address]  [facility] [address]   [phone]              Who to contact     Please call your clinic at [phone] to:    Ask questions about your health    Make or cancel appointments    Discuss your medicines    Learn about your test results    Speak to your doctor   If you have compliments or concerns about an experience at your clinic, or if you wish to file a complaint, please contact [facility] Patient Relations at [phone] or email us at [email or web address]         Additional Information About Your Visit        MyChart Information     RealPage is an electronic gateway that provides easy, online access to your medical records. With RealPage, you can request a clinic appointment, read your test results, renew a prescription or communicate with your care team.     To sign up for RealPage visit the website at [email or web address]   You will be asked to enter the access code  listed below, as well as some personal information. Please follow the directions to create your username and password.     Your access code is: [email or web address]  Expires: 2017  9:20 AM     Your access code will  in 90 days. If you need help or a new code, please contact your [facility] or call [phone] for assistance.        Care EveryWhere ID     This is your Care EveryWhere ID. This could be used by other organizations to access your [place] medical records  [id number]         Blood Pressure from Last 3 Encounters:   No data found for BP    Weight from Last 3 Encounters:   No data found for Wt              Today, you had the following     No orders found for display       Primary Care Provider Office Phone # Fax #    Gustabo Melendez [phone]       [address] [address]        Equal Access to Services     [facility] Campus[facility]ALIM[facility] : Hadii aad ku hadasho Soradhaali, waaxda luqadaha, qaybta kaalmada adeegyada, waxay aneudyin haylilliann april watters . So [facility] [phone].    ATENCIÓN: Si habla español, tiene a jacob disposición servicios gratuitos de asistencia lingüística. Llame al [phone].    We comply with applicable federal civil rights laws and [place] laws. We do not discriminate on the basis of race, color, national origin, age, disability sex, sexual orientation or gender identity.            Thank you!     Thank you for choosing EYE CLINIC  for your care. Our goal is always to provide you with excellent care. Hearing back from our patients is one way we can continue to improve our services. Please take a few minutes to complete the written survey that you may receive in the mail after your visit with us. Thank you!             Your Updated Medication List - Protect others around you: Learn how to safely use, store and throw away your medicines at www.disposemymeds.org.          This list is accurate as of:  8/8/17  9:25 AM.  Always use your most recent med list.                   Brand Name Dispense Instructions for use Diagnosis    acetaZOLAMIDE 500 MG 12 hr capsule    DIAMOX SEQUEL    60 capsule    Take 1 capsule (500 mg) by mouth 2 times daily    Primary open angle glaucoma of both eyes, severe stage       brimonidine 0.2 % ophthalmic solution    ALPHAGAN    15 mL    Place 1 drop into the right eye 3 times daily    Primary open angle glaucoma of both eyes, moderate stage       * ciprofloxacin 0.3 % ophthalmic solution    CILOXAN    1 Bottle    Place 1 drop into the right eye 4 times daily    Primary open angle glaucoma of both eyes, severe stage       * ciprofloxacin 0.3 % ophthalmic solution    CILOXAN    1 Bottle    Place 1 drop into the right eye 4 times daily    Primary open angle glaucoma of both eyes, severe stage       dorzolamide-timolol 2-0.5 % ophthalmic solution    COSOPT    3 Bottle    Place 1 drop into both eyes every 12 hours    Primary open angle glaucoma of both eyes, moderate stage       ketorolac 0.5 % ophthalmic solution    ACULAR    1 Bottle    Place 1 drop into the right eye 3 times daily    Primary open angle glaucoma of both eyes, severe stage       latanoprost 0.005 % ophthalmic solution    XALATAN    2.5 mL    Place 1 drop into both eyes At Bedtime    Primary open angle glaucoma of both eyes, moderate stage       prednisoLONE acetate 1 % ophthalmic susp    PRED FORTE    1 Bottle    Place 1 drop into the right eye every 2 hours    Primary open angle glaucoma of both eyes, severe stage       prednisoLONE acetate 1 % ophthalmic susp    PRED FORTE    1 Bottle    Place 1 drop into the right eye 4 times daily    Primary open angle glaucoma of both eyes, severe stage       SIMVASTATIN PO           * Notice:  This list has 2 medication(s) that are the same as other medications prescribed for you. Read the directions carefully, and ask your doctor or other care provider to review them with you.

## 2017-08-08 NOTE — NURSING NOTE
Chief Complaints and History of Present Illnesses   Patient presents with     Follow Up For     POAG. S/p LSL of all sutures OD.  s/p Bleb Revision OD (5/31/17), s/p Trab OD (4/24/17), s/p SLT OD (1/10 and 6/14)     HPI    Affected eye(s):  Right   Symptoms:     Blurred vision   Decreased vision         Do you have eye pain now?:  No      Comments:  RE vision fluctuates. Seems to be better towards the end of the day.  More comfortable than it used to be.  Catherine Brambila COA 7:34 AM August 8, 2017

## 2017-09-07 ENCOUNTER — OFFICE VISIT (OUTPATIENT)
Dept: OPHTHALMOLOGY | Facility: CLINIC | Age: 77
End: 2017-09-07
Attending: OPHTHALMOLOGY
Payer: MEDICARE

## 2017-09-07 DIAGNOSIS — H40.1133 PRIMARY OPEN ANGLE GLAUCOMA OF BOTH EYES, SEVERE STAGE: Primary | ICD-10-CM

## 2017-09-07 DIAGNOSIS — Z96.1 PSEUDOPHAKIA OF RIGHT EYE: ICD-10-CM

## 2017-09-07 DIAGNOSIS — H25.12 SENILE NUCLEAR SCLEROSIS, LEFT: ICD-10-CM

## 2017-09-07 PROCEDURE — 99212 OFFICE O/P EST SF 10 MIN: CPT | Mod: ZF

## 2017-09-07 ASSESSMENT — CONF VISUAL FIELD
OS_NORMAL: 1
OD_SUPERIOR_NASAL_RESTRICTION: 1
OD_INFERIOR_NASAL_RESTRICTION: 1

## 2017-09-07 ASSESSMENT — EXTERNAL EXAM - LEFT EYE: OS_EXAM: NORMAL

## 2017-09-07 ASSESSMENT — SLIT LAMP EXAM - LIDS: COMMENTS: MGD, BLEPHARITIS

## 2017-09-07 ASSESSMENT — VISUAL ACUITY
OS_PH_SC: 20/40
OD_SC: 20/80
METHOD: SNELLEN - LINEAR
OS_SC: 20/40

## 2017-09-07 ASSESSMENT — TONOMETRY
OD_IOP_MMHG: 24
OS_IOP_MMHG: 22
IOP_METHOD: APPLANATION

## 2017-09-07 ASSESSMENT — EXTERNAL EXAM - RIGHT EYE: OD_EXAM: NORMAL

## 2017-09-07 NOTE — PATIENT INSTRUCTIONS
Patient will continue on Cosopt (Timolol/Dorzolamide) which is a blue top drop 2x/day (12 hours apart) in BOTH EYES, Latanoprost which is a teal top drop at bedtime in BOTH EYES, and restart Alphagan (Brimonidine) which is a purple top drop 2x/day (12 hours apart) in the RIGHT EYE ONLY.    Patient will return to clinic in 2 weeks with repeat evaluation pasha refraction prior to considering cataract surgery. Patient will discontinue and hold onto the Prednisolone.    Patient will start warm compresses 2x/day, increase dietary flax seed/fish oil dietary supplementation, and start artificial tears 4-6x/day as needed for burning, tearing, mattering, foreign body sensation, blurred vision, etc.  Artifical tear drops are available over the counter. Below are a list of the some of the drops available:  Refresh   Systane  Theratears  Genteal  Blink  Optive      Please avoid Visine (unless Visine Puretears), Murine or Cleareyes or Puralube tear drops.    These have ingredients that take the red out and but actually increase dryness and redness of the eyes over time

## 2017-09-07 NOTE — MR AVS SNAPSHOT
After Visit Summary   9/7/2017    Rusty Harris    MRN: [id number]           Patient Information     Date Of Birth          1940        Visit Information        Provider Department      9/7/2017 10:30 AM Glenys Green MD [facility]        Today's Diagnoses     Primary open angle glaucoma of both eyes, severe stage    -  1    Pseudophakia of right eye        Senile nuclear sclerosis, left          Care Instructions    Patient will continue on Cosopt (Timolol/Dorzolamide) which is a blue top drop 2x/day (12 hours apart) in BOTH EYES, Latanoprost which is a teal top drop at bedtime in BOTH EYES, and restart Alphagan (Brimonidine) which is a purple top drop 2x/day (12 hours apart) in the RIGHT EYE ONLY.    Patient will return to clinic in 2 weeks with repeat evaluation pasha refraction prior to considering cataract surgery. Patient will discontinue and hold onto the Prednisolone.    Patient will start warm compresses 2x/day, increase dietary flax seed/fish oil dietary supplementation, and start artificial tears 4-6x/day as needed for burning, tearing, mattering, foreign body sensation, blurred vision, etc.  Artifical tear drops are available over the counter. Below are a list of the some of the drops available:  Refresh   Systane  Theratears  Genteal  Blink  Optive      Please avoid Visine (unless Visine Puretears), Murine or Cleareyes or Puralube tear drops.    These have ingredients that take the red out and but actually increase dryness and redness of the eyes over time            Follow-ups after your visit        Who to contact     Please call your clinic at [phone] to:    Ask questions about your health    Make or cancel appointments    Discuss your medicines    Learn about your test results    Speak to your doctor   If you have compliments or concerns about an experience at your clinic, or if you wish to file a complaint, please contact [facility] Patient  Relations at [phone] or email us at [email or web address]         Additional Information About Your Visit        [employer] Information     Easy-Point is an electronic gateway that provides easy, online access to your medical records. With Easy-Point, you can request a clinic appointment, read your test results, renew a prescription or communicate with your care team.     To sign up for Easy-Point visit the website at [email or web address]   You will be asked to enter the access code listed below, as well as some personal information. Please follow the directions to create your username and password.     Your access code is: [email or web address]  Expires: 2017  9:20 AM     Your access code will  in 90 days. If you need help or a new code, please contact your [facility] or call [phone] for assistance.        Care EveryWhere ID     This is your Care EveryWhere ID. This could be used by other organizations to access your [place] medical records  [id number]         Blood Pressure from Last 3 Encounters:   No data found for BP    Weight from Last 3 Encounters:   No data found for Wt              Today, you had the following     No orders found for display       Primary Care Provider Office Phone # Fax #    Gustabo Melendez [phone]       [address] [address]        Equal Access to Services     HELENE IBRAHIM : Hadii aad ku hadasho Sohiwot, waaxda luqadaha, qaybta kaalmada aprilyada, jackie watters . So [facility] [phone].    ATENCIÓN: Si habla español, tiene a jacob disposición servicios gratuitos de asistencia lingüística. Llame al [phone].    We comply with applicable federal civil rights laws and [place] laws. We do not discriminate on the basis of race, color, national origin, age, disability sex, sexual orientation or gender identity.            Thank you!     Thank you  for choosing EYE CLINIC  for your care. Our goal is always to provide you with excellent care. Hearing back from our patients is one way we can continue to improve our services. Please take a few minutes to complete the written survey that you may receive in the mail after your visit with us. Thank you!             Your Updated Medication List - Protect others around you: Learn how to safely use, store and throw away your medicines at www.disposemymeds.org.          This list is accurate as of: 9/7/17 12:39 PM.  Always use your most recent med list.                   Brand Name Dispense Instructions for use Diagnosis    acetaZOLAMIDE 500 MG 12 hr capsule    DIAMOX SEQUEL    60 capsule    Take 1 capsule (500 mg) by mouth 2 times daily    Primary open angle glaucoma of both eyes, severe stage       brimonidine 0.2 % ophthalmic solution    ALPHAGAN    15 mL    Place 1 drop into the right eye 3 times daily    Primary open angle glaucoma of both eyes, moderate stage       * ciprofloxacin 0.3 % ophthalmic solution    CILOXAN    1 Bottle    Place 1 drop into the right eye 4 times daily    Primary open angle glaucoma of both eyes, severe stage       * ciprofloxacin 0.3 % ophthalmic solution    CILOXAN    1 Bottle    Place 1 drop into the right eye 4 times daily    Primary open angle glaucoma of both eyes, severe stage       dorzolamide-timolol 2-0.5 % ophthalmic solution    COSOPT    3 Bottle    Place 1 drop into both eyes every 12 hours    Primary open angle glaucoma of both eyes, moderate stage       ketorolac 0.5 % ophthalmic solution    ACULAR    1 Bottle    Place 1 drop into the right eye 3 times daily    Primary open angle glaucoma of both eyes, severe stage       latanoprost 0.005 % ophthalmic solution    XALATAN    2.5 mL    Place 1 drop into both eyes At Bedtime    Primary open angle glaucoma of both eyes, moderate stage       prednisoLONE acetate 1 % ophthalmic susp    PRED FORTE    1 Bottle    Place 1 drop into  the right eye every 2 hours    Primary open angle glaucoma of both eyes, severe stage       prednisoLONE acetate 1 % ophthalmic susp    PRED FORTE    1 Bottle    Place 1 drop into the right eye 4 times daily    Primary open angle glaucoma of both eyes, severe stage       SIMVASTATIN PO           * Notice:  This list has 2 medication(s) that are the same as other medications prescribed for you. Read the directions carefully, and ask your doctor or other care provider to review them with you.

## 2017-09-07 NOTE — NURSING NOTE
Chief Complaints and History of Present Illnesses   Patient presents with     Follow Up For     s/p Anterior uveitis - Right Eye (Primary      HPI    Symptoms:     Blurred vision   No decreased vision   Floaters   No flashes   Photophobia            Comments:  States va is the same since last visit, Blurry RE vision over the last 4 weeks    Pred forte 1-2 x/day OD  Latanoprost qhs OU last used 10:00 night before last  Cosopt bid OU 7:00 AM  Jose Yoon  10:59 AM September 7, 2017

## 2017-09-07 NOTE — PROGRESS NOTES
1)POAG -- s/p Bleb Revision OD (5/31/17), s/p Trab OD (4/24/17), s/p SLT OD (1/10 and 6/14) -- K pachy:531/562 Tmax: 29/28 HVF: OD:Superior and Inferior arcuate defects (prog in 2017) and Superior arcuate stable from 1520-8475 with possible progression in 2014 and OD:Sup arcuate with IN dec sens in 2016 and OS:Fluct  CDR:0.85/0.5 HRT/OCT: OD:Mod RNFL thinning with ?prog in 2016 and OS:RNFL WNL FHX of Glc: No Gonio: open Intolerant to: Asthma/COPD: No, tolerating topical BB Steroid Use: Yes Kidney Stones: No Sulfa Allergy: No IOP targets: OD:M-Hteens and OS:Hteens-L20s  --  rec tube OD if IOP remains above target off steroid  2)H/O Ch Nevus OS  3)NS OS -- rec OD:Phaco/trab and OS:Phaco/MIGS   4)S/p evacuation of bilateral subdural hematoma 7/2/16 -- H/O CTs and MRI at [facility] in 2016  5)PCIOL OD  6)H/O Bleb Leak OD s/p Bleb revision -- resolved with BCTL -- remains joaquina negative  7)H/O Uveitis OD -- Quiescent    S/p LSL of all sutures    MD (done previously):Glare test OD:20/80 and OS:20/60    Patient will continue on Cosopt (Timolol/Dorzolamide) which is a blue top drop 2x/day (12 hours apart) in BOTH EYES, Latanoprost which is a teal top drop at bedtime in BOTH EYES, and restart Alphagan (Brimonidine) which is a purple top drop 2x/day (12 hours apart) in the RIGHT EYE ONLY.    Patient will return to clinic in 2 weeks with repeat evaluation and refraction prior to considering cataract surgery. Patient will discontinue and hold onto the Prednisolone.      Resident Note (Please Follow Final Note Above)  S: Here for repeat refraction right eye and eval of right eye fluctuations in blurry vision, photophobia, and irritation in the right eye x 3 days. Right eye feels comfortable while closed. Vision clears with blinking. Good drop compliance. Left eye stable.   O: IOP good and bleb joaquina negative right eye but injection and AC inflammation present.  A/P: ?source of inflammation. Joaquina negative      Res plan:  IOP RE  above goal at 23 today (goal mid-high teens), OS at goal   Cont Latanoprost QHS OU  Cont Cosopt BId OU      Attending Physician Attestation:  Complete documentation of historical and exam elements from today's encounter can be found in the full encounter summary report (not reduplicated in this progress note). I personally obtained the chief complaint(s) and history of present illness.  I confirmed and edited as necessary the review of systems, past medical/surgical history, family history, social history, and examination findings as documented by others; and I examined the patient myself. I personally reviewed the relevant tests, images, and reports as documented above. I formulated and edited as necessary the assessment and plan and discussed the findings and management plan with the patient and family.  - Glenys Green MD

## 2017-09-21 ENCOUNTER — TELEPHONE (OUTPATIENT)
Dept: OPHTHALMOLOGY | Facility: CLINIC | Age: 77
End: 2017-09-21

## 2017-09-21 ENCOUNTER — OFFICE VISIT (OUTPATIENT)
Dept: OPHTHALMOLOGY | Facility: CLINIC | Age: 77
End: 2017-09-21
Attending: OPHTHALMOLOGY
Payer: MEDICARE

## 2017-09-21 DIAGNOSIS — H40.1133 PRIMARY OPEN ANGLE GLAUCOMA OF BOTH EYES, SEVERE STAGE: Primary | ICD-10-CM

## 2017-09-21 DIAGNOSIS — Z96.1 PSEUDOPHAKIA OF RIGHT EYE: ICD-10-CM

## 2017-09-21 DIAGNOSIS — H25.12 SENILE NUCLEAR SCLEROSIS, LEFT: ICD-10-CM

## 2017-09-21 PROCEDURE — 99213 OFFICE O/P EST LOW 20 MIN: CPT | Mod: ZF

## 2017-09-21 PROCEDURE — 92015 DETERMINE REFRACTIVE STATE: CPT | Mod: ZF

## 2017-09-21 PROCEDURE — 76519 ECHO EXAM OF EYE: CPT | Mod: LT,ZF | Performed by: OPHTHALMOLOGY

## 2017-09-21 ASSESSMENT — REFRACTION_WEARINGRX
OS_CYLINDER: +1.50
OS_SPHERE: -0.50
OS_ADD: +2.75
OD_CYLINDER: +1.25
OD_AXIS: 008
OD_SPHERE: -0.50
OS_AXIS: 010
OD_ADD: +2.75

## 2017-09-21 ASSESSMENT — TONOMETRY
OD_IOP_MMHG: 14
OD_IOP_MMHG: 16
OS_IOP_MMHG: 16
IOP_METHOD: APPLANATION
IOP_METHOD: APPLANATION
OS_IOP_MMHG: 15

## 2017-09-21 ASSESSMENT — VISUAL ACUITY
OS_SC: 20/40
OD_SC: 20/30
METHOD: SNELLEN - LINEAR
OS_PH_SC: 20/30

## 2017-09-21 ASSESSMENT — REFRACTION_MANIFEST
OD_SPHERE: -0.25
OD_AXIS: 180
OD_CYLINDER: +1.50
OS_AXIS: 180
OS_ADD: +2.75
OS_CYLINDER: +0.50
OS_SPHERE: -1.75
OD_ADD: +2.75

## 2017-09-21 ASSESSMENT — EXTERNAL EXAM - LEFT EYE: OS_EXAM: NORMAL

## 2017-09-21 ASSESSMENT — SLIT LAMP EXAM - LIDS: COMMENTS: MGD, BLEPHARITIS

## 2017-09-21 ASSESSMENT — CONF VISUAL FIELD
OS_NORMAL: 1
METHOD: COUNTING FINGERS
OD_NORMAL: 1

## 2017-09-21 ASSESSMENT — EXTERNAL EXAM - RIGHT EYE: OD_EXAM: NORMAL

## 2017-09-21 NOTE — NURSING NOTE
Chief Complaints and History of Present Illnesses   Patient presents with     Glaucoma Follow Up     2 week follow up both eyes.     HPI    Affected eye(s):  Both   Symptoms:     No floaters   No flashes   No redness   No Dryness         Do you have eye pain now?:  No      Comments:  Pt states vision appears better after having stitches removed. No eye pain or irritation today.    Brian JOSE September 21, 2017 7:43 AM

## 2017-09-21 NOTE — MR AVS SNAPSHOT
After Visit Summary   9/21/2017    Rusty Harris    MRN: [id number]           Patient Information     Date Of Birth          1940        Visit Information        Provider Department      9/21/2017 7:30 AM Glenys Green MD [facility]        Today's Diagnoses     Primary open angle glaucoma of both eyes, severe stage    -  1    Pseudophakia of right eye        Senile nuclear sclerosis, left          Care Instructions    Patient will continue on Cosopt (Timolol/Dorzolamide) which is a blue top drop 2x/day (12 hours apart) in BOTH EYES, Latanoprost which is a teal top drop at bedtime in BOTH EYES, and Alphagan (Brimonidine) which is a purple top drop 2x/day (12 hours apart) in the RIGHT EYE ONLY.    A long discussion of the risks, benefits, and alternatives including potential treatment and management options were had with patient and a decision was made to proceed with cataract and Istent surgery in the left eye.          Follow-ups after your visit        Your next 10 appointments already scheduled     Nov 28, 2017  7:45 AM CST   Post-Op with Glenys Green MD   Eye Clinic ([facility])    Paul Whitney  [address] [place] [address] Clin [address] [place] [id number] [phone]            Dec 05, 2017 12:45 PM CST   Post-Op with Glenys Green MD   Eye Clinic ([facility])    Paul Whitney  [address] [place] [address] Clin [address] [place] [address]   [phone]              Who to contact     Please call your clinic at [phone] to:    Ask questions about your health    Make or cancel appointments    Discuss your medicines    Learn about your test results    Speak to your doctor   If you have compliments or concerns about an experience at your clinic, or if you wish to file a complaint, please contact [facility] Patient Relations at [phone] or email us at [email or web address]         Additional  Information About Your Visit        HealthEdge Information     HealthEdge is an electronic gateway that provides easy, online access to your medical records. With HealthEdge, you can request a clinic appointment, read your test results, renew a prescription or communicate with your care team.     To sign up for HealthEdge visit the website at [email or web address]   You will be asked to enter the access code listed below, as well as some personal information. Please follow the directions to create your username and password.     Your access code is: [email or web address]  Expires: 2017  9:20 AM     Your access code will  in 90 days. If you need help or a new code, please contact your [facility] or call [phone] for assistance.        Care EveryWhere ID     This is your Care EveryWhere ID. This could be used by other organizations to access your [facility] medical records  [id number]         Blood Pressure from Last 3 Encounters:   No data found for BP    Weight from Last 3 Encounters:   No data found for Wt              We Performed the Following     IOL Biometry w/ IOL calc (2nd eye - 2nd sx) LEFT EYE     [place]-Operative Worksheet (Glaucoma)        Primary Care Provider Office Phone # Fax #    Gustabo Melendez [phone]       [address] [address]        Equal Access to Services     HELENE IBRAHIM : Hadii aad ku hadasho Soomaali, waaxda luqadaha, qaybta kaalmada adeegyada, jackie browning. So [facility] [phone].    ATENCIÓN: Si habla español, tiene a jacob disposición servicios gratuitos de asistencia lingüística. Llame al [phone].    We comply with applicable federal civil rights laws and [place] laws. We do not discriminate on the basis of race, color, national origin, age, disability, sex, sexual orientation, or gender identity.            Thank you!     Thank you for choosing EYE CLINIC  for  your care. Our goal is always to provide you with excellent care. Hearing back from our patients is one way we can continue to improve our services. Please take a few minutes to complete the written survey that you may receive in the mail after your visit with us. Thank you!             Your Updated Medication List - Protect others around you: Learn how to safely use, store and throw away your medicines at www.disposemymeds.org.          This list is accurate as of: 9/21/17 11:59 PM.  Always use your most recent med list.                   Brand Name Dispense Instructions for use Diagnosis    acetaZOLAMIDE 500 MG 12 hr capsule    DIAMOX SEQUEL    60 capsule    Take 1 capsule (500 mg) by mouth 2 times daily    Primary open angle glaucoma of both eyes, severe stage       brimonidine 0.2 % ophthalmic solution    ALPHAGAN    15 mL    Place 1 drop into the right eye 3 times daily    Primary open angle glaucoma of both eyes, moderate stage       dorzolamide-timolol 2-0.5 % ophthalmic solution    COSOPT    3 Bottle    Place 1 drop into both eyes every 12 hours    Primary open angle glaucoma of both eyes, moderate stage       latanoprost 0.005 % ophthalmic solution    XALATAN    2.5 mL    Place 1 drop into both eyes At Bedtime    Primary open angle glaucoma of both eyes, moderate stage       SIMVASTATIN PO

## 2017-09-21 NOTE — PATIENT INSTRUCTIONS
Patient will continue on Cosopt (Timolol/Dorzolamide) which is a blue top drop 2x/day (12 hours apart) in BOTH EYES, Latanoprost which is a teal top drop at bedtime in BOTH EYES, and Alphagan (Brimonidine) which is a purple top drop 2x/day (12 hours apart) in the RIGHT EYE ONLY.    A long discussion of the risks, benefits, and alternatives including potential treatment and management options were had with patient and a decision was made to proceed with cataract and Istent surgery in the left eye.

## 2017-09-21 NOTE — PROGRESS NOTES
1)POAG -- s/p Bleb Revision OD (5/31/17), s/p Trab OD (4/24/17), s/p SLT OD (1/10 and 6/14) -- K pachy:531/562 Tmax: 29/28 HVF: OD:Superior and Inferior arcuate defects (prog in 2017) and Superior arcuate stable from 8046-1553 with possible progression in 2014 and OD:Sup arcuate with IN dec sens in 2016 and OS:Fluct  CDR:0.85/0.5 HRT/OCT: OD:Mod RNFL thinning with ?prog in 2016 and OS:RNFL WNL FHX of Glc: No Gonio: open Intolerant to: Asthma/COPD: No, tolerating topical BB Steroid Use: Yes Kidney Stones: No Sulfa Allergy: No IOP targets: OD:M-Hteens and OS:Hteens-L20s  --  rec tube OD if IOP remains above target off steroid  2)H/O Ch Nevus OS  3)NS OS -- rec OD:Phaco/trab and OS:Phaco/MIGS   4)S/p evacuation of bilateral subdural hematoma 7/2/16 -- H/O CTs and MRI at [facility] in 2016  5)PCIOL OD  6)H/O Bleb Leak OD s/p Bleb revision -- resolved with BCTL -- remains joaquina negative  7)H/O Uveitis OD -- Quiescent    S/p LSL of all sutures    MD (done previously):Glare test OD:20/80 and OS:20/60    Patient will continue on Cosopt (Timolol/Dorzolamide) which is a blue top drop 2x/day (12 hours apart) in BOTH EYES, Latanoprost which is a teal top drop at bedtime in BOTH EYES, and Alphagan (Brimonidine) which is a purple top drop 2x/day (12 hours apart) in the RIGHT EYE ONLY.    A long discussion of the risks, benefits, and alternatives including potential treatment and management options were had with patient and a decision was made to proceed with cataract and Istent surgery in the left eye.      Resident Note:   Pt reports new Alphagan gtt added at last visit to RE is going well without side effects. He reports good compliance w/ gtts medications. Eyes have been comfortable without pain. Vision has been stable. Pt continues to note difficulty w/ night driving 2/2 to not seeing as well. - glare, -halos.    Resident Plan:  IOP improved today OU at goal OU.   Recommend CPM gtts  Regarding cataract OS, recommend CE/IOL  placement as pt is symptomatic BCVA 20/40 OS. Will help to improve IOP OS as well. Pt would like to discuss.  RTC for IOL calcs, will hold on dispensing MRx in setting of planned surgery.    Sunni Belle MD  Ophthalmology PGY3    Attending Physician Attestation:  Complete documentation of historical and exam elements from today's encounter can be found in the full encounter summary report (not reduplicated in this progress note). I personally obtained the chief complaint(s) and history of present illness.  I confirmed and edited as necessary the review of systems, past medical/surgical history, family history, social history, and examination findings as documented by others; and I examined the patient myself. I personally reviewed the relevant tests, images, and reports as documented above. I formulated and edited as necessary the assessment and plan and discussed the findings and management plan with the patient and family.  - Glenys Green MD

## 2017-11-27 ENCOUNTER — TRANSFERRED RECORDS (OUTPATIENT)
Dept: HEALTH INFORMATION MANAGEMENT | Facility: CLINIC | Age: 77
End: 2017-11-27

## 2017-11-28 ENCOUNTER — OFFICE VISIT (OUTPATIENT)
Dept: OPHTHALMOLOGY | Facility: CLINIC | Age: 77
End: 2017-11-28
Attending: OPHTHALMOLOGY
Payer: MEDICARE

## 2017-11-28 DIAGNOSIS — Z96.1 PSEUDOPHAKIA OF BOTH EYES: Primary | ICD-10-CM

## 2017-11-28 PROCEDURE — 99212 OFFICE O/P EST SF 10 MIN: CPT | Mod: ZF

## 2017-11-28 RX ORDER — BROMFENAC 1.03 MG/ML
1 SOLUTION/ DROPS OPHTHALMIC
COMMUNITY
Start: 2017-11-27 | End: 2018-01-05

## 2017-11-28 RX ORDER — PREDNISOLONE ACETATE 10 MG/ML
1 SUSPENSION/ DROPS OPHTHALMIC
COMMUNITY
Start: 2017-11-27 | End: 2018-03-30

## 2017-11-28 RX ORDER — GATIFLOXACIN 5 MG/ML
1 SOLUTION/ DROPS OPHTHALMIC
COMMUNITY
Start: 2017-11-27 | End: 2018-09-06

## 2017-11-28 ASSESSMENT — EXTERNAL EXAM - RIGHT EYE: OD_EXAM: NORMAL

## 2017-11-28 ASSESSMENT — VISUAL ACUITY
METHOD: SNELLEN - LINEAR
OD_SC: 20/40
OD_SC+: -1
OS_SC+: +2
OS_PH_SC: 20/30+2
OS_SC: 20/40

## 2017-11-28 ASSESSMENT — REFRACTION_WEARINGRX
OD_SPHERE: -0.50
OD_ADD: +2.75
OS_SPHERE: -0.50
OD_CYLINDER: +1.25
OS_CYLINDER: +1.50
OS_ADD: +2.75
OS_AXIS: 010
OD_AXIS: 008

## 2017-11-28 ASSESSMENT — CONF VISUAL FIELD
OS_NORMAL: 1
METHOD: COUNTING FINGERS
OD_INFERIOR_TEMPORAL_RESTRICTION: 3
OD_SUPERIOR_TEMPORAL_RESTRICTION: 3
OD_SUPERIOR_NASAL_RESTRICTION: 1
OD_INFERIOR_NASAL_RESTRICTION: 1

## 2017-11-28 ASSESSMENT — TONOMETRY
IOP_METHOD: APPLANATION
OS_IOP_MMHG: 17
OD_IOP_MMHG: 17

## 2017-11-28 ASSESSMENT — EXTERNAL EXAM - LEFT EYE: OS_EXAM: NORMAL

## 2017-11-28 ASSESSMENT — SLIT LAMP EXAM - LIDS: COMMENTS: MGD, BLEPHARITIS

## 2017-11-28 NOTE — MR AVS SNAPSHOT
After Visit Summary   11/28/2017    Rusty Harris    MRN: [id number]           Patient Information     Date Of Birth          1940        Visit Information        Provider Department      11/28/2017 7:45 AM Glenys Green MD [facility]        Today's Diagnoses     Pseudophakia of both eyes    -  1      Care Instructions    Patient will continue on Cosopt (Timolol/Dorzolamide) which is a blue top drop 2x/day (12 hours apart) in BOTH EYES, Latanoprost which is a teal top drop at bedtime in BOTH EYES, and Alphagan (Brimonidine) which is a purple top drop 2x/day (12 hours apart) in the RIGHT EYE ONLY.       No heavy lifting, bending, stooping, straining, rubbing the eye, or getting water in the eye.  Please wear protective eyewear over the eye at all times including glasses during the day and the protective shield at bedtime.  Patient will return to clinic in 1-2 weeks with refraction for new glasses.    Use the following drops (LEFT EYE ONLY):  Antibiotic --  Gatifoxacin (tan top): 4x/day until finished (use for at least 5-7 days after surgery)    NSAID -- Bromfenac 1x/day: for approximately 4 weeks after surgery (or until gone)    Steroid -- Pred Forte/Prednisolone Acetate:  4x/day for 1 week then, 3x/day for 1 week then, 2x/day for 1 week then, 1x/day for 1 week then, stop.    Please be sure to call if you have any decrease in vision, increase in pain, flashing lights, redness, or a lot of drainage.            Follow-ups after your visit        Your next 10 appointments already scheduled     Dec 05, 2017 12:45 PM CST   Post-Op with Glenys Green MD   Eye Clinic ([facility])    Paul Gage [facility]  [address]  [address]  [facility] [id number] [phone]              Who to contact     Please call your clinic at [phone] to:    Ask questions about your health    Make or cancel appointments    Discuss your medicines    Learn about your test  results    Speak to your doctor   If you have compliments or concerns about an experience at your clinic, or if you wish to file a complaint, please contact [facility] Patient Relations at [phone] or email us at [email or web address]         Additional Information About Your Visit        [employer] Information     knowNormalt is an electronic gateway that provides easy, online access to your medical records. With [employer], you can request a clinic appointment, read your test results, renew a prescription or communicate with your care team.     To sign up for Hostway visit the website at [email or web address]   You will be asked to enter the access code listed below, as well as some personal information. Please follow the directions to create your username and password.     Your access code is: [email or web address]  Expires: 2018  6:31 AM     Your access code will  in 90 days. If you need help or a new code, please contact your [facility] or call [phone] for assistance.        Care EveryWhere ID     This is your Care EveryWhere ID. This could be used by other organizations to access your [facility] medical records  [id number]         Blood Pressure from Last 3 Encounters:   No data found for BP    Weight from Last 3 Encounters:   No data found for Wt              Today, you had the following     No orders found for display       Primary Care Provider Office Phone # Fax #    Gustabo Melendez [phone]       [address] [address]        Equal Access to Services     HELENE IBRAHIM AH: Hadii aad ku hadasho Soomaali, waaxda luqadaha, qaybta kaalmada adeegyada, waxay alvaro browning. So [facility] [phone].    ATENCIÓN: Si habla español, tiene a jacob disposición servicios gratuitos de asistencia lingüística. Llame al [phone].    We comply with applicable federal civil  rights laws and [place] laws. We do not discriminate on the basis of race, color, national origin, age, disability, sex, sexual orientation, or gender identity.            Thank you!     Thank you for choosing EYE CLINIC  for your care. Our goal is always to provide you with excellent care. Hearing back from our patients is one way we can continue to improve our services. Please take a few minutes to complete the written survey that you may receive in the mail after your visit with us. Thank you!             Your Updated Medication List - Protect others around you: Learn how to safely use, store and throw away your medicines at www.disposemymeds.org.          This list is accurate as of: 11/28/17  8:30 AM.  Always use your most recent med list.                   Brand Name Dispense Instructions for use Diagnosis    acetaZOLAMIDE 500 MG 12 hr capsule    DIAMOX SEQUEL    60 capsule    Take 1 capsule (500 mg) by mouth 2 times daily    Primary open angle glaucoma of both eyes, severe stage       brimonidine 0.2 % ophthalmic solution    ALPHAGAN    15 mL    Place 1 drop into the right eye 3 times daily    Primary open angle glaucoma of both eyes, moderate stage       Bromfenac Sodium 0.09 % Soln    BROMDAY     1 drop        dorzolamide-timolol 2-0.5 % ophthalmic solution    COSOPT    3 Bottle    Place 1 drop into both eyes every 12 hours    Primary open angle glaucoma of both eyes, moderate stage       gatifloxacin 0.5 % ophthalmic solution    ZYMAXID     1 drop        latanoprost 0.005 % ophthalmic solution    XALATAN    2.5 mL    Place 1 drop into both eyes At Bedtime    Primary open angle glaucoma of both eyes, moderate stage       prednisoLONE acetate 1 % ophthalmic susp    PRED FORTE     1 drop        SIMVASTATIN PO

## 2017-11-28 NOTE — PATIENT INSTRUCTIONS
Patient will continue on Cosopt (Timolol/Dorzolamide) which is a blue top drop 2x/day (12 hours apart) in BOTH EYES, Latanoprost which is a teal top drop at bedtime in BOTH EYES, and Alphagan (Brimonidine) which is a purple top drop 2x/day (12 hours apart) in the RIGHT EYE ONLY.       No heavy lifting, bending, stooping, straining, rubbing the eye, or getting water in the eye.  Please wear protective eyewear over the eye at all times including glasses during the day and the protective shield at bedtime.  Patient will return to clinic in 1-2 weeks with refraction for new glasses.    Use the following drops (LEFT EYE ONLY):  Antibiotic --  Gatifoxacin (tan top): 4x/day until finished (use for at least 5-7 days after surgery)    NSAID -- Bromfenac 1x/day: for approximately 4 weeks after surgery (or until gone)    Steroid -- Pred Forte/Prednisolone Acetate:  4x/day for 1 week then, 3x/day for 1 week then, 2x/day for 1 week then, 1x/day for 1 week then, stop.    Please be sure to call if you have any decrease in vision, increase in pain, flashing lights, redness, or a lot of drainage.

## 2017-11-28 NOTE — PROGRESS NOTES
1)POAG -- s/p Bleb Revision OD (5/31/17), s/p Trab OD (4/24/17), s/p SLT OD (1/10 and 6/14) -- K pachy:531/562 Tmax: 29/28 HVF: OD:Superior and Inferior arcuate defects (prog in 2017) and Superior arcuate stable from 3187-5525 with possible progression in 2014 and OD:Sup arcuate with IN dec sens in 2016 and OS:Fluct  CDR:0.85/0.5 HRT/OCT: OD:Mod RNFL thinning with ?prog in 2016 and OS:RNFL WNL FHX of Glc: No Gonio: open Intolerant to: Asthma/COPD: No, tolerating topical BB Steroid Use: Yes Kidney Stones: No Sulfa Allergy: No IOP targets: OD:M-Hteens and OS:Hteens-L20s  --  rec tube OD if IOP remains above target off steroid  2)H/O Ch Nevus OS  3)NS OS -- rec OD:Phaco/trab and OS:Phaco/MIGS   4)S/p evacuation of bilateral subdural hematoma 7/2/16 -- H/O CTs and MRI at [facility] in 2016  5)PCIOL OD  6)H/O Bleb Leak OD s/p Bleb revision -- resolved with BCTL -- remains joaquina negative  7)H/O Uveitis OD -- Quiescent    S/p LSL of all sutures    Patient will continue on Cosopt (Timolol/Dorzolamide) which is a blue top drop 2x/day (12 hours apart) in BOTH EYES, Latanoprost which is a teal top drop at bedtime in BOTH EYES, and Alphagan (Brimonidine) which is a purple top drop 2x/day (12 hours apart) in the RIGHT EYE ONLY.       No heavy lifting, bending, stooping, straining, rubbing the eye, or getting water in the eye.  Please wear protective eyewear over the eye at all times including glasses during the day and the protective shield at bedtime.  Patient will return to clinic in 1-2 weeks with refraction for new glasses.    Use the following drops (LEFT EYE ONLY):  Antibiotic --  Gatifoxacin (tan top): 4x/day until finished (use for at least 5-7 days after surgery)    NSAID -- Bromfenac 1x/day: for approximately 4 weeks after surgery (or until gone)    Steroid -- Pred Forte/Prednisolone Acetate:  4x/day for 1 week then, 3x/day for 1 week then, 2x/day for 1 week then, 1x/day for 1 week then, stop.    Please be sure to call  if you have any decrease in vision, increase in pain, flashing lights, redness, or a lot of drainage.

## 2017-12-05 ENCOUNTER — OFFICE VISIT (OUTPATIENT)
Dept: OPHTHALMOLOGY | Facility: CLINIC | Age: 77
End: 2017-12-05
Attending: OPHTHALMOLOGY
Payer: MEDICARE

## 2017-12-05 DIAGNOSIS — Z96.1 PSEUDOPHAKIA OF BOTH EYES: Primary | ICD-10-CM

## 2017-12-05 PROCEDURE — 92015 DETERMINE REFRACTIVE STATE: CPT | Mod: ZF

## 2017-12-05 PROCEDURE — 99213 OFFICE O/P EST LOW 20 MIN: CPT | Mod: ZF

## 2017-12-05 ASSESSMENT — REFRACTION_WEARINGRX
OD_ADD: +2.75
OS_AXIS: 010
OS_ADD: +2.75
OD_CYLINDER: +1.25
OS_CYLINDER: +1.50
OS_SPHERE: -0.50
OD_SPHERE: -0.50
OD_AXIS: 008

## 2017-12-05 ASSESSMENT — REFRACTION_MANIFEST
OS_SPHERE: -0.75
OD_AXIS: 005
OS_ADD: +2.75
OD_CYLINDER: +0.75
OS_AXIS: 150
OD_ADD: +2.75
OD_SPHERE: PLANO
OS_CYLINDER: +0.75

## 2017-12-05 ASSESSMENT — CONF VISUAL FIELD
OD_INFERIOR_TEMPORAL_RESTRICTION: 3
OS_NORMAL: 1
OD_SUPERIOR_TEMPORAL_RESTRICTION: 3
METHOD: COUNTING FINGERS
OD_SUPERIOR_NASAL_RESTRICTION: 1
OD_INFERIOR_NASAL_RESTRICTION: 1

## 2017-12-05 ASSESSMENT — TONOMETRY
OS_IOP_MMHG: 28
OD_IOP_MMHG: 20
IOP_METHOD: APPLANATION

## 2017-12-05 ASSESSMENT — EXTERNAL EXAM - RIGHT EYE: OD_EXAM: NORMAL

## 2017-12-05 ASSESSMENT — VISUAL ACUITY
OD_SC+: +2
METHOD: SNELLEN - LINEAR
OD_SC: 20/50
OS_SC: 20/20
OS_SC+: -2

## 2017-12-05 ASSESSMENT — EXTERNAL EXAM - LEFT EYE: OS_EXAM: NORMAL

## 2017-12-05 ASSESSMENT — SLIT LAMP EXAM - LIDS: COMMENTS: MGD, BLEPHARITIS

## 2017-12-05 NOTE — PROGRESS NOTES
1)POAG -- s/p Istent OS (11/27/17), s/p Bleb Revision OD (5/31/17), s/p Trab OD (4/24/17), s/p SLT OD (1/10 and 6/14) -- K pachy:531/562 Tmax: 29/28 HVF: OD:Superior and Inferior arcuate defects (prog in 2017) and Superior arcuate stable from 4279-0621 with possible progression in 2014 and OD:Sup arcuate with IN dec sens in 2016 and OS:Fluct  CDR:0.85/0.5 HRT/OCT: OD:Mod RNFL thinning with ?prog in 2016 and OS:RNFL WNL FHX of Glc: No Gonio: open Intolerant to: Asthma/COPD: No, tolerating topical BB Steroid Use: Yes Kidney Stones: No Sulfa Allergy: No IOP targets: OD:M-Hteens and OS:Hteens-L20s  --  rec tube OD if IOP remains above target off steroid  2)H/O Ch Nevus OS  3)PCIOL OU  4)S/p evacuation of bilateral subdural hematoma 7/2/16 -- H/O CTs and MRI at [facility] in 2016  5)H/O Bleb Leak OD s/p Bleb revision -- resolved with BCTL -- remains joaquina negative  6)H/O Uveitis OD -- Quiescent  7)CME OD -- needs retina eval -- will start on Pred QID and Acular QID    S/p LSL of all sutures    Patient will continue on Cosopt (Timolol/Dorzolamide) which is a blue top drop 2x/day (12 hours apart) in BOTH EYES, Latanoprost which is a teal top drop at bedtime in BOTH EYES, and Alphagan (Brimonidine) which is a purple top drop 2x/day (12 hours apart) in the RIGHT EYE ONLY.       No heavy lifting, bending, stooping, straining, rubbing the eye, or getting water in the eye.  Please wear protective eyewear over the eye at all times including glasses during the day and the protective shield at bedtime.  Patient will return to clinic in 1 month with repeat check.  Patient will also follow up with the retina clinic.      Use the following drops (LEFT EYE ONLY):  Antibiotic --  Gatifoxacin (tan top): Discontinue    NSAID -- Bromfenac 1x/day: for approximately 3 more weeks after surgery (or until gone)    Steroid -- Pred Forte/Prednisolone Acetate:  3x/day for 1 week then, 2x/day for 1 week then, 1x/day for 1 week then,  stop.      Please be sure to call if you have any decrease in vision, increase in pain, flashing lights, redness, or a lot of drainage.

## 2017-12-05 NOTE — PATIENT INSTRUCTIONS
Patient will continue on Cosopt (Timolol/Dorzolamide) which is a blue top drop 2x/day (12 hours apart) in BOTH EYES, Latanoprost which is a teal top drop at bedtime in BOTH EYES, and Alphagan (Brimonidine) which is a purple top drop 2x/day (12 hours apart) in the RIGHT EYE ONLY.       No heavy lifting, bending, stooping, straining, rubbing the eye, or getting water in the eye.  Please wear protective eyewear over the eye at all times including glasses during the day and the protective shield at bedtime.  Patient will return to clinic in 1 month with repeat check.  Patient will also follow up with the retina clinic.      Use the following drops (LEFT EYE ONLY):  Antibiotic --  Gatifoxacin (tan top): Discontinue    NSAID -- Bromfenac 1x/day: for approximately 3 more weeks after surgery (or until gone)    Steroid -- Pred Forte/Prednisolone Acetate:  3x/day for 1 week then, 2x/day for 1 week then, 1x/day for 1 week then, stop.      Please be sure to call if you have any decrease in vision, increase in pain, flashing lights, redness, or a lot of drainage.

## 2017-12-05 NOTE — NURSING NOTE
Chief Complaints and History of Present Illnesses   Patient presents with     Follow Up For     1 week follow up s/p CE/IOL Left Eye     HPI    Affected eye(s):  Left   Symptoms:     No floaters   No flashes   No redness   No tearing   No Dryness         Do you have eye pain now?:  No      Comments:  Pt states vision has been good since last visit. No eye pain today.    Brian JOSE December 5, 2017 1:05 PM

## 2018-01-05 ENCOUNTER — OFFICE VISIT (OUTPATIENT)
Dept: OPHTHALMOLOGY | Facility: CLINIC | Age: 78
End: 2018-01-05
Attending: OPHTHALMOLOGY
Payer: MEDICARE

## 2018-01-05 DIAGNOSIS — Z96.1 PSEUDOPHAKIA OF BOTH EYES: Primary | ICD-10-CM

## 2018-01-05 DIAGNOSIS — H40.1133 PRIMARY OPEN ANGLE GLAUCOMA OF BOTH EYES, SEVERE STAGE: ICD-10-CM

## 2018-01-05 PROCEDURE — G0463 HOSPITAL OUTPT CLINIC VISIT: HCPCS | Mod: ZF

## 2018-01-05 ASSESSMENT — VISUAL ACUITY
METHOD: SNELLEN - LINEAR
OS_SC: 20/20
OD_SC: 20/25
OD_SC+: -3
OS_SC+: -3

## 2018-01-05 ASSESSMENT — SLIT LAMP EXAM - LIDS: COMMENTS: MGD, BLEPHARITIS

## 2018-01-05 ASSESSMENT — TONOMETRY
OS_IOP_MMHG: 12
IOP_METHOD: APPLANATION
OD_IOP_MMHG: 20

## 2018-01-05 ASSESSMENT — EXTERNAL EXAM - RIGHT EYE: OD_EXAM: NORMAL

## 2018-01-05 ASSESSMENT — EXTERNAL EXAM - LEFT EYE: OS_EXAM: NORMAL

## 2018-01-05 NOTE — MR AVS SNAPSHOT
After Visit Summary   1/5/2018    Rusty Harris    MRN: [id number]           Patient Information     Date Of Birth          1940        Visit Information        Provider Department      1/5/2018 10:15 AM Glenys Green MD [facility]        Today's Diagnoses     Pseudophakia of both eyes    -  1    Primary open angle glaucoma of both eyes, severe stage          Care Instructions    Patient will continue on Cosopt (Timolol/Dorzolamide) which is a blue top drop 2x/day (12 hours apart) in BOTH EYES, Latanoprost which is a teal top drop at bedtime in BOTH EYES, and Alphagan (Brimonidine) which is a purple top drop 2x/day (12 hours apart) in BOTH EYES.   Patient will also continue on Bromfenac 2x/day in BOTH EYES.  Patient will follow up with Dr. Schaeffer and return to clinic in 2-3 months with visual field test, dilated eye exam and OCT with RNFL analysis.          Follow-ups after your visit        Follow-up notes from your care team     Return 2-3 months with visual field test, dilated eye exam and OCT with RNFL analysis.      Your next 10 appointments already scheduled     Mar 30, 2018 10:00 AM CDT   RETURN GLAUCOMA with Glenys Green MD   Eye Clinic ([facility])    [facility]  [address]  [address]  [facility] [id number] [phone]              Who to contact     Please call your clinic at [phone] to:    Ask questions about your health    Make or cancel appointments    Discuss your medicines    Learn about your test results    Speak to your doctor   If you have compliments or concerns about an experience at your clinic, or if you wish to file a complaint, please contact [facility] Patient Relations at [phone] or email us at [email or web address]         Additional Information About Your Visit        MyChart Information     [employer] is an electronic gateway that provides easy, online access to your medical  records. With [employer], you can request a clinic appointment, read your test results, renew a prescription or communicate with your care team.     To sign up for [employer] visit the website at [email or web address]   You will be asked to enter the access code listed below, as well as some personal information. Please follow the directions to create your username and password.     Your access code is: [email or web address]  Expires: 2018  6:31 AM     Your access code will  in 90 days. If you need help or a new code, please contact your [facility] or call [phone] for assistance.        Care EveryWhere ID     This is your Care EveryWhere ID. This could be used by other organizations to access your [place] medical records  [id number]         Blood Pressure from Last 3 Encounters:   No data found for BP    Weight from Last 3 Encounters:   No data found for Wt              Today, you had the following     No orders found for display         Today's Medication Changes          These changes are accurate as of: 18 12:44 PM.  If you have any questions, ask your nurse or doctor.               Start taking these medicines.        Dose/Directions    bromfenac 0.09 % ophthalmic solution   Commonly known as:  XIBROM   Used for:  Primary open angle glaucoma of both eyes, severe stage   Started by:  Glenys Green MD        Dose:  1 drop   Place 1 drop into both eyes 2 times daily   Quantity:  5 mL   Refills:  11            Where to get your medicines      These medications were sent to [place], [place] - [address] [place] [address]     Phone:  [phone]     bromfenac 0.09 % ophthalmic solution                Primary Care Provider Office Phone # Fax #    Gustabo Melendez [phone]       [address] [address]        Equal Access to Services      HELENE IBRAHIM : Hadii aad ku abdullahi Quintanilla, waaxda luqadaha, qaybta kaalmada balaji, jackie aneudyin hayaadinora sanchezadnnie leos janene . So [facility] [phone].    ATENCIÓN: Si habla matt, tiene a jacob disposición servicios gratuitos de asistencia lingüística. Llame al [phone].    We comply with applicable federal civil rights laws and [place] laws. We do not discriminate on the basis of race, color, national origin, age, disability, sex, sexual orientation, or gender identity.            Thank you!     Thank you for choosing EYE CLINIC  for your care. Our goal is always to provide you with excellent care. Hearing back from our patients is one way we can continue to improve our services. Please take a few minutes to complete the written survey that you may receive in the mail after your visit with us. Thank you!             Your Updated Medication List - Protect others around you: Learn how to safely use, store and throw away your medicines at www.disposemymeds.org.          This list is accurate as of: 1/5/18 12:44 PM.  Always use your most recent med list.                   Brand Name Dispense Instructions for use Diagnosis    acetaZOLAMIDE 500 MG 12 hr capsule    DIAMOX SEQUEL    60 capsule    Take 1 capsule (500 mg) by mouth 2 times daily    Primary open angle glaucoma of both eyes, severe stage       brimonidine 0.2 % ophthalmic solution    ALPHAGAN    15 mL    Place 1 drop into the right eye 3 times daily    Primary open angle glaucoma of both eyes, moderate stage       bromfenac 0.09 % ophthalmic solution    XIBROM    5 mL    Place 1 drop into both eyes 2 times daily    Primary open angle glaucoma of both eyes, severe stage       dorzolamide-timolol 2-0.5 % ophthalmic solution    COSOPT    3 Bottle    Place 1 drop into both eyes every 12 hours    Primary open angle glaucoma of both eyes, moderate stage       gatifloxacin 0.5 % ophthalmic solution    ZYMAXID     1 drop        latanoprost 0.005 % ophthalmic  solution    XALATAN    2.5 mL    Place 1 drop into both eyes At Bedtime    Primary open angle glaucoma of both eyes, moderate stage       prednisoLONE acetate 1 % ophthalmic susp    PRED FORTE     1 drop        SIMVASTATIN PO

## 2018-01-05 NOTE — NURSING NOTE
Chief Complaints and History of Present Illnesses   Patient presents with     Post Op (Ophthalmology) Left Eye     CE/IOL 11-27-17; IOP check     HPI    Affected eye(s):  Left   Symptoms:     No decreased vision   No floaters   No flashes      Duration:  4 weeks   Frequency:  Constant       Do you have eye pain now?:  No      Comments:  Pt here for one month f/u CE/IOL, left eye - IOP check today    Ocular meds:  Cosopt 2x/day, both eyes  Latanoprost QHS, both eyes  Alphagan 2x/day, both eyes - per pt (last note says RE only)  Pred Forte 2x/day, left eye    Catherine Wood COA 10:47 AM January 5, 2018

## 2018-01-05 NOTE — PROGRESS NOTES
1)POAG -- s/p Istent OS (11/27/17), s/p Bleb Revision OD (5/31/17), s/p Trab OD (4/24/17), s/p SLT OD (1/10 and 6/14) -- K pachy:531/562 Tmax: 29/28 HVF: OD:Superior and Inferior arcuate defects (prog in 2017) and Superior arcuate stable from 6812-7790 with possible progression in 2014 and OD:Sup arcuate with IN dec sens in 2016 and OS:Fluct  CDR:0.85/0.5 HRT/OCT: OD:Mod RNFL thinning with ?prog in 2016 and OS:RNFL WNL FHX of Glc: No Gonio: open Intolerant to: Asthma/COPD: No, tolerating topical BB Steroid Use: Yes Kidney Stones: No Sulfa Allergy: No IOP targets: OD:M-Hteens and OS:Hteens-L20s  --  rec tube OD if IOP remains above target off steroid  2)H/O Ch Nevus OS  3)PCIOL OU  4)S/p evacuation of bilateral subdural hematoma 7/2/16 -- H/O CTs and MRI at [facility] in 2016  5)H/O Bleb Leak OD s/p Bleb revision -- resolved with BCTL -- remains joaquina negative  6)H/O Uveitis OD -- Quiescent  7)CME OD -- following with Dr. Schaeffer    S/p LSL of all sutures    Patient will continue on Cosopt (Timolol/Dorzolamide) which is a blue top drop 2x/day (12 hours apart) in BOTH EYES, Latanoprost which is a teal top drop at bedtime in BOTH EYES, and Alphagan (Brimonidine) which is a purple top drop 2x/day (12 hours apart) in BOTH EYES.   Patient will also continue on Bromfenac 2x/day in BOTH EYES.  Patient will follow up with Dr. Schaeffer and return to clinic in 2-3 months with visual field test, dilated eye exam and OCT with RNFL analysis.      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 Schaeffer  We will restart bromfenac twice a day right eye, PF four times a day right eye  Continue cosopt, latanoprost both eyes, alphagan right eye  Stop bromfenac and pred forte left  eye    Return to clinic 1 month, IOP check and discuss possible tube surgery right eye  Felipe Casillas MD  PGY3, Dept of Ophthalmology  Pager [phone]    Attending Physician Attestation:  Complete documentation of historical and exam elements from today's encounter can be found in the full encounter summary report (not reduplicated in this progress note). I personally obtained the chief complaint(s) and history of present illness.  I confirmed and edited as necessary the review of systems, past medical/surgical history, family history, social history, and examination findings as documented by others; and I examined the patient myself. I personally reviewed the relevant tests, images, and reports as documented above. I formulated and edited as necessary the assessment and plan and discussed the findings and management plan with the patient and family.  - Glenys Green MD

## 2018-01-05 NOTE — PATIENT INSTRUCTIONS
Patient will continue on Cosopt (Timolol/Dorzolamide) which is a blue top drop 2x/day (12 hours apart) in BOTH EYES, Latanoprost which is a teal top drop at bedtime in BOTH EYES, and Alphagan (Brimonidine) which is a purple top drop 2x/day (12 hours apart) in BOTH EYES.   Patient will also continue on Bromfenac 2x/day in BOTH EYES.  Patient will follow up with Dr. Schaeffer and return to clinic in 2-3 months with visual field test, dilated eye exam and OCT with RNFL analysis.

## 2018-01-08 DIAGNOSIS — H35.353 CYSTOID MACULAR EDEMA OF BOTH EYES: Primary | ICD-10-CM

## 2018-01-08 RX ORDER — KETOROLAC TROMETHAMINE 5 MG/ML
1 SOLUTION OPHTHALMIC 4 TIMES DAILY
Qty: 1 BOTTLE | Refills: 1 | Status: SHIPPED | OUTPATIENT
Start: 2018-01-08 | End: 2018-03-30

## 2018-02-09 DIAGNOSIS — H40.1132 PRIMARY OPEN ANGLE GLAUCOMA OF BOTH EYES, MODERATE STAGE: Primary | ICD-10-CM

## 2018-02-11 RX ORDER — LATANOPROST 50 UG/ML
1 SOLUTION/ DROPS OPHTHALMIC AT BEDTIME
Qty: 7.5 ML | Refills: 11 | Status: SHIPPED | OUTPATIENT
Start: 2018-02-11 | End: 2019-04-13

## 2018-02-11 RX ORDER — DORZOLAMIDE HYDROCHLORIDE AND TIMOLOL MALEATE 20; 5 MG/ML; MG/ML
SOLUTION/ DROPS OPHTHALMIC
Qty: 30 ML | Refills: 1 | OUTPATIENT
Start: 2018-02-11

## 2018-02-11 NOTE — TELEPHONE ENCOUNTER
Medication:  latanoprost (XALATAN) 0.005 % ophthalmic solution    Last Written Prescription Date:  12/1/16  Last Fill Quantity: 2.5ml,   # refills: 11    Last Office Visit: 1/5/18  Future Office visit: 3/30/18    Attending Provider: Norma    Latanoprost which is a teal top drop at bedtime in BOTH EYES    Medication: PF cosopt   New order      Cosopt (Timolol/Dorzolamide) which is a blue top drop 2x/day (12 hours apart) in BOTH EYES

## 2018-02-28 ENCOUNTER — TELEPHONE (OUTPATIENT)
Dept: OPHTHALMOLOGY | Facility: CLINIC | Age: 78
End: 2018-02-28

## 2018-02-28 NOTE — TELEPHONE ENCOUNTER
----- Message from Ni Lu, COA sent at 2/27/2018  4:10 PM CST -----  Dr. Schaeffer's office called while patient was in office to schedule with Dr. Green in the next week because of increased IOP (31/15).  I asked the patient if I could call back around 11:00 and he said fine.  I called at 11:15, 1:00 and 4:00.  I could not reach him.  LM with triage #.  Stefany offered this appt if it is still avail.  March 7th Wednesday at 2:30  Or with dr. Schuler some day.

## 2018-02-28 NOTE — TELEPHONE ENCOUNTER
Dr. Schaeffer at  referring back to dr. pedraza for increased right eye IOP    Reviewed by glaucoma-- pt currently scheduled 3-30-18 with dr. keila pedraza not able to see pt next week    Pt offered next week with dr. Schuler for evaluation and pt accepts  Pt scheduled next weds  Miguel Choudhary RN 10:13 AM 02/28/18

## 2018-03-07 ENCOUNTER — OFFICE VISIT (OUTPATIENT)
Dept: OPHTHALMOLOGY | Facility: CLINIC | Age: 78
End: 2018-03-07
Attending: OPHTHALMOLOGY
Payer: MEDICARE

## 2018-03-07 DIAGNOSIS — H40.1133 PRIMARY OPEN ANGLE GLAUCOMA OF BOTH EYES, SEVERE STAGE: Primary | ICD-10-CM

## 2018-03-07 PROCEDURE — G0463 HOSPITAL OUTPT CLINIC VISIT: HCPCS | Mod: ZF

## 2018-03-07 RX ORDER — ACETAZOLAMIDE 500 MG/1
500 CAPSULE, EXTENDED RELEASE ORAL 2 TIMES DAILY
Qty: 60 CAPSULE | Refills: 1 | Status: SHIPPED | OUTPATIENT
Start: 2018-03-07 | End: 2018-05-03

## 2018-03-07 ASSESSMENT — EXTERNAL EXAM - LEFT EYE: OS_EXAM: NORMAL

## 2018-03-07 ASSESSMENT — TONOMETRY
IOP_METHOD: TONOPEN
OS_IOP_MMHG: 16
OS_IOP_MMHG: 14
OD_IOP_MMHG: 28
IOP_METHOD: TONOPEN
IOP_METHOD: TONOPEN
OD_IOP_MMHG: 29
OD_IOP_MMHG: 31

## 2018-03-07 ASSESSMENT — VISUAL ACUITY
OS_SC: 20/20
OS_SC+: -1
OD_SC: 20/20
OD_SC+: -1
METHOD: SNELLEN - LINEAR

## 2018-03-07 ASSESSMENT — EXTERNAL EXAM - RIGHT EYE: OD_EXAM: NORMAL

## 2018-03-07 ASSESSMENT — SLIT LAMP EXAM - LIDS: COMMENTS: MGD, BLEPHARITIS

## 2018-03-07 NOTE — PROGRESS NOTES
1)POAG -- s/p Istent OS (11/27/17), s/p Bleb Revision OD (5/31/17), s/p Trab OD (4/24/17), s/p SLT OD (1/10 and 6/14) -- K pachy:531/562 Tmax: 31/28 HVF: OD:Superior and Inferior arcuate defects (prog in 2017) and Superior arcuate stable from 2586-0357 with possible progression in 2014 and OD:Sup arcuate with IN dec sens in 2016 and OS:Fluct  CDR:0.85/0.5 HRT/OCT: OD:Mod RNFL thinning with ?prog in 2016 and OS:RNFL WNL FHX of Glc: No Gonio: open Intolerant to: Asthma/COPD: No, tolerating topical BB Steroid Use: Yes Kidney Stones: No Sulfa Allergy: No IOP targets: OD:M-Hteens and OS:Hteens-L20s  --  rec tube OD if IOP remains above target off steroid    Mr. Harris states he had been followed by Dr. Schaeffer for macular edema right eye which had improved, and he is now off prednisolone (still on Bromfenac). However, his IOP right eye was 31 at last check with Dr. Schaeffer and remains 31 today.    He is scheduled to see Dr. Green at the end of March, and he may consider additional surgical treatment at that time.    Until then:  Continue as currently taking:  - Latanoprost QHS OU  - Brimonidine BID OU  - Timolol BID OU  - Brinzolamide BID OU  - Bromfenac BID OU    Start oral acetazolamide 500mg ER 2 times per day. Discussed side effects.    Recommend he return for IOP recheck in 1 week to ensure IOP has improved. If improved at that visit, then will have him continue the current regimen plus diamox until he sees Dr. Green on 3/30/18.      Not addressed today:  2)H/O Ch Nevus OS  3)PCIOL OU  4)S/p evacuation of bilateral subdural hematoma 7/2/16 -- H/O CTs and MRI at [facility] in 2016  5)H/O Bleb Leak OD s/p Bleb revision -- resolved with BCTL -- remains joaquina negative  6)H/O Uveitis OD -- Quiescent  7)CME OD -- following with Dr. Schaeffer    Teaching statement:  Complete documentation of historical and exam elements from today's encounter can be found in the full encounter summary report (not reduplicated in this progress note).  I personally obtained the chief complaint(s) and history of present illness.  I confirmed and edited as necessary the review of systems, past medical/surgical history, family history, social history, and examination findings as documented by others; and I examined the patient myself. I personally reviewed the relevant tests, images, and reports as documented above.     I formulated and edited as necessary the assessment and plan and discussed the findings and management plan with the patient and family.    Catherine Schuler MD  Comprehensive Ophthalmology & Ocular Pathology  Department of Ophthalmology and Visual Neurosciences  [email or web address]  Pager [phone]

## 2018-03-07 NOTE — NURSING NOTE
Chief Complaints and History of Present Illnesses   Patient presents with     Consult For     high pressures     HPI    Affected eye(s):  Right   Symptoms:     Blurred vision   No tearing   No Dryness   Itching   No burning   No photophobia            Comments:  Pt unable to get cosopt because it is on back order. Retina looks better- said Dr. Schaeffer but concerned with the pressure in the RE  He went to see Dr. Schaeffer and he was told to come here because his pressures were high (thinks RE 32, LE was ok- low teens)  RE sore and itchy  Len Givens  2:26 PM March 7, 2018

## 2018-03-07 NOTE — MR AVS SNAPSHOT
After Visit Summary   3/7/2018    Rusty Harris    MRN: [id number]           Patient Information     Date Of Birth          1940        Visit Information        Provider Department      3/7/2018 1:45 PM Catherine Schuler MD Eye Clinic        Today's Diagnoses     Primary open angle glaucoma of both eyes, severe stage    -  1       Follow-ups after your visit        Your next 10 appointments already scheduled     Mar 12, 2018  2:15 PM CDT   RETURN GENERAL with Catherine Schuler MD   Eye Clinic ([facility])    [address] [address]   [phone]            Mar 30, 2018 10:00 AM CDT   RETURN GLAUCOMA with Glenys Geren MD   Eye Clinic ([facility])    [address] [address]   [phone]              Who to contact     Please call your clinic at [phone] to:    Ask questions about your health    Make or cancel appointments    Discuss your medicines    Learn about your test results    Speak to your doctor            Additional Information About Your Visit        MyChart Information     "[employer] is an electronic gateway that provides easy, online access to your medical records. With "[employer] you can request a clinic appointment, read your test results, renew a prescription or communicate with your care team.     To sign up for "[employer] visit the website at [email or web address]   You will be asked to enter the access code listed below, as well as some personal information. Please follow the directions to create your username and password.     Your access code is: [email or web address]  Expires: 2018  6:30 AM     Your access code will  in 90 days. If you need help or a new code, please contact your [facility] or call [phone] for assistance.        Care EveryWhere ID     This is your Care EveryWhere ID.  This could be used by other organizations to access your [place] medical records  [id number]         Blood Pressure from Last 3 Encounters:   No data found for BP    Weight from Last 3 Encounters:   No data found for Wt              Today, you had the following     No orders found for display         Today's Medication Changes          These changes are accurate as of 3/7/18  3:11 PM.  If you have any questions, ask your nurse or doctor.               These medicines have changed or have updated prescriptions.        Dose/Directions    * acetaZOLAMIDE 500 MG 12 hr capsule   Commonly known as:  DIAMOX SEQUEL   This may have changed:  Another medication with the same name was added. Make sure you understand how and when to take each.   Used for:  Primary open angle glaucoma of both eyes, severe stage        Dose:  500 mg   Take 1 capsule (500 mg) by mouth 2 times daily   Quantity:  60 capsule   Refills:  3       * acetaZOLAMIDE 500 MG 12 hr capsule   Commonly known as:  DIAMOX SEQUEL   This may have changed:  You were already taking a medication with the same name, and this prescription was added. Make sure you understand how and when to take each.   Used for:  Primary open angle glaucoma of both eyes, severe stage        Dose:  500 mg   Take 1 capsule (500 mg) by mouth 2 times daily   Quantity:  60 capsule   Refills:  1       * Notice:  This list has 2 medication(s) that are the same as other medications prescribed for you. Read the directions carefully, and ask your doctor or other care provider to review them with you.         Where to get your medicines      These medications were sent to [address] [place] [address] E, [facility] [address]     Phone:  [phone]     acetaZOLAMIDE 500 MG 12 hr capsule                Primary Care Provider Office Phone # Fax #    Gustabo Solisgildardo [phone]       [address]  [address]  [facility] [address]        Equal Access to Services     HODALIVIA ALEXANDRIA : Hadii aad syd abdullahi Sohiwot, waaxda luqadaha, qaybta kasravan danielgabriellavee, waxcurtis alvaro eastondinora sanchezdannie jaspatriciaotto browning. So [facility] [phone].    ATENCIÓN: Si habla español, tiene a jacob disposición servicios gratuitos de asistencia lingüística. Llame al [phone].    We comply with applicable federal civil rights laws and [place] laws. We do not discriminate on the basis of race, color, national origin, age, disability, sex, sexual orientation, or gender identity.            Thank you!     Thank you for choosing EYE CLINIC  for your care. Our goal is always to provide you with excellent care. Hearing back from our patients is one way we can continue to improve our services. Please take a few minutes to complete the written survey that you may receive in the mail after your visit with us. Thank you!             Your Updated Medication List - Protect others around you: Learn how to safely use, store and throw away your medicines at www.disposemymeds.org.          This list is accurate as of 3/7/18  3:11 PM.  Always use your most recent med list.                   Brand Name Dispense Instructions for use Diagnosis    * acetaZOLAMIDE 500 MG 12 hr capsule    DIAMOX SEQUEL    60 capsule    Take 1 capsule (500 mg) by mouth 2 times daily    Primary open angle glaucoma of both eyes, severe stage       * acetaZOLAMIDE 500 MG 12 hr capsule    DIAMOX SEQUEL    60 capsule    Take 1 capsule (500 mg) by mouth 2 times daily    Primary open angle glaucoma of both eyes, severe stage       brimonidine 0.2 % ophthalmic solution    ALPHAGAN    15 mL    Place 1 drop into the right eye 3 times daily    Primary open angle glaucoma of both eyes, moderate stage       bromfenac 0.09 % ophthalmic solution    XIBROM    5 mL    Place 1 drop into both eyes 2 times daily    Primary open angle glaucoma of both eyes, severe stage       * dorzolamide-timolol 2-0.5 %  ophthalmic solution    COSOPT    3 Bottle    Place 1 drop into both eyes every 12 hours    Primary open angle glaucoma of both eyes, moderate stage       * Dorzolamide HCl-Timolol Mal PF 22.3-6.8 MG/ML Soln    COSOPT PF    60 each    Place 1 drop into both eyes 2 times daily 12 hrs apart    Primary open angle glaucoma of both eyes, moderate stage       gatifloxacin 0.5 % ophthalmic solution    ZYMAXID     1 drop        ketorolac 0.5 % ophthalmic solution    ACULAR    1 Bottle    Place 1 drop into both eyes 4 times daily    Cystoid macular edema of both eyes       latanoprost 0.005 % ophthalmic solution    XALATAN    7.5 mL    Place 1 drop into both eyes At Bedtime    Primary open angle glaucoma of both eyes, moderate stage       prednisoLONE acetate 1 % ophthalmic susp    PRED FORTE     1 drop        SIMVASTATIN PO           * Notice:  This list has 4 medication(s) that are the same as other medications prescribed for you. Read the directions carefully, and ask your doctor or other care provider to review them with you.

## 2018-03-12 ENCOUNTER — OFFICE VISIT (OUTPATIENT)
Dept: OPHTHALMOLOGY | Facility: CLINIC | Age: 78
End: 2018-03-12
Attending: OPHTHALMOLOGY
Payer: MEDICARE

## 2018-03-12 DIAGNOSIS — H40.1133 PRIMARY OPEN ANGLE GLAUCOMA OF BOTH EYES, SEVERE STAGE: Primary | ICD-10-CM

## 2018-03-12 PROCEDURE — G0463 HOSPITAL OUTPT CLINIC VISIT: HCPCS | Mod: ZF

## 2018-03-12 ASSESSMENT — TONOMETRY
OS_IOP_MMHG: 9
OD_IOP_MMHG: 13
IOP_METHOD: TONOPEN

## 2018-03-12 ASSESSMENT — VISUAL ACUITY
METHOD: SNELLEN - LINEAR
OS_SC+: -1
OD_SC: 20/20
OD_SC+: -2
OS_SC: 20/20

## 2018-03-12 ASSESSMENT — EXTERNAL EXAM - RIGHT EYE: OD_EXAM: NORMAL

## 2018-03-12 ASSESSMENT — SLIT LAMP EXAM - LIDS: COMMENTS: MGD, BLEPHARITIS

## 2018-03-12 ASSESSMENT — EXTERNAL EXAM - LEFT EYE: OS_EXAM: NORMAL

## 2018-03-12 NOTE — MR AVS SNAPSHOT
After Visit Summary   3/12/2018    Rusty Harris    MRN: [id number]           Patient Information     Date Of Birth          1940        Visit Information        Provider Department      3/12/2018 2:15 PM Catherine Schuler MD Eye Clinic        Today's Diagnoses     Primary open angle glaucoma of both eyes, severe stage    -  1       Follow-ups after your visit        Your next 10 appointments already scheduled     Mar 30, 2018 10:00 AM CDT   RETURN GLAUCOMA with Glenys Green MD   Eye Clinic ([facility])    [place] [address]  9[facility] [address] [place] [id number]   [phone]              Who to contact     Please call your clinic at [phone] to:    Ask questions about your health    Make or cancel appointments    Discuss your medicines    Learn about your test results    Speak to your doctor            Additional Information About Your Visit        MyChart Information     Tri-Medicst is an electronic gateway that provides easy, online access to your medical records. With StudyCloud, you can request a clinic appointment, read your test results, renew a prescription or communicate with your care team.     To sign up for Tri-Medicst visit the website at [email or web address]   You will be asked to enter the access code listed below, as well as some personal information. Please follow the directions to create your username and password.     Your access code is: [email or web address]  Expires: 2018  7:30 AM     Your access code will  in 90 days. If you need help or a new code, please contact your [facility] or call [phone] for assistance.        Care EveryWhere ID     This is your Care EveryWhere ID. This could be used by other organizations to access your [place] medical records  [id number]         Blood Pressure from Last 3 Encounters:   No data found for BP    Weight from Last 3 Encounters:   No data found for  Wt              Today, you had the following     No orders found for display       Primary Care Provider Office Phone # Fax #    Gustabo Melendez [phone]       [address] [address]        Equal Access to Services     HODALIVIA [place] : Hadii tamera velarde hadfredericko Soomaali, waaxda luqadaha, qaybta kaalmada adeegyada, waxcurtis aneudyin haylilliann adedannie leos janene browning. So [facility] [phone].    ATENCIÓN: Si habla español, tiene a jacob disposición servicios gratuitos de asistencia lingüística. Llame al [phone].    We comply with applicable federal civil rights laws and [place] laws. We do not discriminate on the basis of race, color, national origin, age, disability, sex, sexual orientation, or gender identity.            Thank you!     Thank you for choosing EYE CLINIC  for your care. Our goal is always to provide you with excellent care. Hearing back from our patients is one way we can continue to improve our services. Please take a few minutes to complete the written survey that you may receive in the mail after your visit with us. Thank you!             Your Updated Medication List - Protect others around you: Learn how to safely use, store and throw away your medicines at www.disposemymeds.org.          This list is accurate as of 3/12/18  6:41 PM.  Always use your most recent med list.                   Brand Name Dispense Instructions for use Diagnosis    * acetaZOLAMIDE 500 MG 12 hr capsule    DIAMOX SEQUEL    60 capsule    Take 1 capsule (500 mg) by mouth 2 times daily    Primary open angle glaucoma of both eyes, severe stage       * acetaZOLAMIDE 500 MG 12 hr capsule    DIAMOX SEQUEL    60 capsule    Take 1 capsule (500 mg) by mouth 2 times daily    Primary open angle glaucoma of both eyes, severe stage       brimonidine 0.2 % ophthalmic solution    ALPHAGAN    15 mL    Place 1 drop into the right eye 3 times daily    Primary open angle glaucoma of both eyes,  moderate stage       bromfenac 0.09 % ophthalmic solution    XIBROM    5 mL    Place 1 drop into both eyes 2 times daily    Primary open angle glaucoma of both eyes, severe stage       * dorzolamide-timolol 2-0.5 % ophthalmic solution    COSOPT    3 Bottle    Place 1 drop into both eyes every 12 hours    Primary open angle glaucoma of both eyes, moderate stage       * dorzolamide-timolol PF 22.3-6.8 MG/ML opthalmic solution    COSOPT PF    60 each    Place 1 drop into both eyes 2 times daily 12 hrs apart    Primary open angle glaucoma of both eyes, moderate stage       gatifloxacin 0.5 % ophthalmic solution    ZYMAXID     1 drop        ketorolac 0.5 % ophthalmic solution    ACULAR    1 Bottle    Place 1 drop into both eyes 4 times daily    Cystoid macular edema of both eyes       latanoprost 0.005 % ophthalmic solution    XALATAN    7.5 mL    Place 1 drop into both eyes At Bedtime    Primary open angle glaucoma of both eyes, moderate stage       prednisoLONE acetate 1 % ophthalmic susp    PRED FORTE     1 drop        SIMVASTATIN PO           * Notice:  This list has 4 medication(s) that are the same as other medications prescribed for you. Read the directions carefully, and ask your doctor or other care provider to review them with you.

## 2018-03-12 NOTE — PROGRESS NOTES
1)POAG -- s/p Istent OS (11/27/17), s/p Bleb Revision OD (5/31/17), s/p Trab OD (4/24/17), s/p SLT OD (1/10 and 6/14) -- K pachy:531/562 Tmax: 31/28 HVF: OD:Superior and Inferior arcuate defects (prog in 2017) and Superior arcuate stable from 1679-7789 with possible progression in 2014 and OD:Sup arcuate with IN dec sens in 2016 and OS:Fluct  CDR:0.85/0.5 HRT/OCT: OD:Mod RNFL thinning with ?prog in 2016 and OS:RNFL WNL FHX of Glc: No Gonio: open Intolerant to: Asthma/COPD: No, tolerating topical BB Steroid Use: Yes Kidney Stones: No Sulfa Allergy: No IOP targets: OD:M-Hteens and OS:Hteens-L20s  --  rec tube OD if IOP remains above target off steroid     Mr. Harris states he had been followed by Dr. Schaeffer for macular edema right eye which had improved, and he is now off prednisolone (still on Bromfenac). IOP decreased from 31 to 13 today OD which is excellent. Will maintain patient on current regimen until Dr. Green's visit on 3/30/18. Patient tolerating Diamox well without any side effects.      Until then:  Continue as currently taking:  - Latanoprost QHS OU  - Brimonidine BID OU  - Timolol BID OU  - Brinzolamide BID OU  - Bromfenac BID OU  - oral acetazolamide 500mg ER 2 times per day    Not addressed today:  2)H/O Ch Nevus OS  3)PCIOL OU  4)S/p evacuation of bilateral subdural hematoma 7/2/16 -- H/O CTs and MRI at [facility] in 2016  5)H/O Bleb Leak OD s/p Bleb revision -- resolved with BCTL -- remains joaquina negative  6)H/O Uveitis OD -- Quiescent  7)CME OD -- following with Dr. Laury Todd M.D.  PGY-2, Ophthalmology    Teaching statement:  Complete documentation of historical and exam elements from today's encounter can be found in the full encounter summary report (not reduplicated in this progress note). I personally obtained the chief complaint(s) and history of present illness.  I confirmed and edited as necessary the review of systems, past medical/surgical history, family history, social history,  and examination findings as documented by others; and I examined the patient myself. I personally reviewed the relevant tests, images, and reports as documented above.     I formulated and edited as necessary the assessment and plan and discussed the findings and management plan with the patient and family.    Catherine Schuler MD  Comprehensive Ophthalmology & Ocular Pathology  Department of Ophthalmology and Visual Neurosciences  [email or web address]  Pager [phone]

## 2018-03-12 NOTE — NURSING NOTE
Chief Complaints and History of Present Illnesses   Patient presents with     Follow Up For     pressure check     HPI    Affected eye(s):  Both   Symptoms:     Blurred vision   No floaters   No flashes   No redness   No foreign body sensation   No itching      Duration:  1 week      Do you have eye pain now?:  No      Comments:  Blurry vision at times  Everything seems to be going good  Started taking oral acetazolamide 500mg on Thursday    Len Givens  2:56 PM March 12, 2018

## 2018-03-14 ENCOUNTER — TELEPHONE (OUTPATIENT)
Dept: OPHTHALMOLOGY | Facility: CLINIC | Age: 78
End: 2018-03-14

## 2018-03-14 NOTE — TELEPHONE ENCOUNTER
Patient with Primary open angle glaucoma (POAG) scheduled to Dr. Green in 2 weeks. Was seen by myself and Dr. Schuler a few days ago in clinic. On Diamox 500 mg ER BID plus MMT for elevated IOP (30s). Tolerating diamox well at time of visit a few days ago with great response. Asked to continue. Today calls with some nausea and dizziness. I returned call and left a voicemail recommending to decrease to once a day and if side effects do not resolve to call back.     Joshua Todd M.D.  PGY-2, Ophthalmology

## 2018-03-14 NOTE — TELEPHONE ENCOUNTER
----- Message from Miguel Choudhary RN sent at 3/14/2018  3:03 PM CDT -----  Regarding: FW: Pt is having difficulty with Rx...  Contact: [phone]      ----- Message -----     From: Joselin Fajardo     Sent: 3/14/2018   2:18 PM       To: Eye Triage-[facility]  Subject: Pt is having difficulty with Rx...               acetaZOLAMIDE (DIAMOX SEQUEL) 500 MG 12 hr capsule.  Pt  took it for 5 days, eye pressure on 3/12 went down, pt is happy about that, but the pills are making pt dizzy, a little nausea, light-headed. Pt wants to know---Can he take 1 tablet/day instead of 2/day?    Please call pt at [phone]    Thank you,  Jhon ZUNIGA    Please DO NOT send this message and/or reply back to sender.  Call Center Representatives DO NOT respond to messages.

## 2018-03-30 ENCOUNTER — OFFICE VISIT (OUTPATIENT)
Dept: OPHTHALMOLOGY | Facility: CLINIC | Age: 78
End: 2018-03-30
Attending: OPHTHALMOLOGY
Payer: MEDICARE

## 2018-03-30 DIAGNOSIS — H40.1190 POAG (PRIMARY OPEN-ANGLE GLAUCOMA): ICD-10-CM

## 2018-03-30 DIAGNOSIS — Z96.1 PSEUDOPHAKIA OF BOTH EYES: ICD-10-CM

## 2018-03-30 DIAGNOSIS — H40.1133 PRIMARY OPEN ANGLE GLAUCOMA OF BOTH EYES, SEVERE STAGE: Primary | ICD-10-CM

## 2018-03-30 PROCEDURE — 92083 EXTENDED VISUAL FIELD XM: CPT | Mod: ZF | Performed by: OPHTHALMOLOGY

## 2018-03-30 PROCEDURE — G0463 HOSPITAL OUTPT CLINIC VISIT: HCPCS | Mod: ZF

## 2018-03-30 PROCEDURE — 92133 CPTRZD OPH DX IMG PST SGM ON: CPT | Mod: ZF | Performed by: OPHTHALMOLOGY

## 2018-03-30 ASSESSMENT — REFRACTION_WEARINGRX
OS_AXIS: 010
OD_AXIS: 008
OS_ADD: +2.75
OD_ADD: +2.75
OD_SPHERE: -0.50
OS_CYLINDER: +1.50
OS_SPHERE: -0.50
OD_CYLINDER: +1.25

## 2018-03-30 ASSESSMENT — VISUAL ACUITY
OS_PH_SC: 20/25+2
OD_SC: 20/25
OD_SC+: -2
METHOD: SNELLEN - LINEAR
OS_SC: 20/25

## 2018-03-30 ASSESSMENT — CUP TO DISC RATIO
OS_RATIO: 0.5
OD_RATIO: 0.9

## 2018-03-30 ASSESSMENT — TONOMETRY
OS_IOP_MMHG: 12
OD_IOP_MMHG: 21
IOP_METHOD: APPLANATION

## 2018-03-30 ASSESSMENT — EXTERNAL EXAM - RIGHT EYE: OD_EXAM: NORMAL

## 2018-03-30 ASSESSMENT — SLIT LAMP EXAM - LIDS: COMMENTS: MGD, BLEPHARITIS

## 2018-03-30 ASSESSMENT — EXTERNAL EXAM - LEFT EYE: OS_EXAM: NORMAL

## 2018-03-30 NOTE — MR AVS SNAPSHOT
After Visit Summary   3/30/2018    Rusty Harris    MRN: [id number]           Patient Information     Date Of Birth          1940        Visit Information        Provider Department      3/30/2018 10:00 AM Glenys Green MD [facility] Clinic        Today's Diagnoses     Primary open angle glaucoma of both eyes, severe stage    -  1    POAG (primary open-angle glaucoma)        Pseudophakia of both eyes          Care Instructions    Patient will continue on Cosopt (Timolol/Dorzolamide) which is a blue top drop 2x/day (12 hours apart) in BOTH EYES, Latanoprost which is a teal top drop at bedtime in BOTH EYES, and Alphagan (Brimonidine) which is a purple top drop 2x/day (12 hours apart) in BOTH EYES.   Patient will also continue Diamox 500mg Sequels 2x/day (12 hours apart) if able to tolerate.  Patient will continue to follow up with Dr. Schaeffer.  A long discussion of the risks, benefits, and alternatives including potential treatment and management options were had with patient and a decision was made to proceed with tube surgery in the right eye.            Follow-ups after your visit        Who to contact     Please call your clinic at [phone] to:    Ask questions about your health    Make or cancel appointments    Discuss your medicines    Learn about your test results    Speak to your doctor            Additional Information About Your Visit        Virtusizehart Information     OpGen is an electronic gateway that provides easy, online access to your medical records. With OpGen, you can request a clinic appointment, read your test results, renew a prescription or communicate with your care team.     To sign up for OpGen visit the website at [email or web address]   You will be asked to enter the access code listed below, as well as some personal information. Please follow the directions to create your username and password.     Your access code is: [email or web address]  Expires: 5/30/2018  7:30  AM     Your access code will  in 90 days. If you need help or a new code, please contact your [facility] or call [phone] for assistance.        Care EveryWhere ID     This is your Care EveryWhere ID. This could be used by other organizations to access your [place] medical records  [id number]         Blood Pressure from Last 3 Encounters:   No data found for BP    Weight from Last 3 Encounters:   No data found for Wt              We Performed the Following     OCT Optic Nerve RNFL Spectralis OU (both eyes)     OVF 24-2 Dynamic OU     Tawny-Operative Worksheet (Glaucoma)        Primary Care Provider Office Phone # Fax #    Gustabo Melendez [phone]       [address] [address]        Equal Access to Services     HELENE IBRAHIM : Hadii tamera bayo Soomaali, waaxda luqadaha, qaybta kaalmada adeegyada, jackie watters . So [facility] [phone].    ATENCIÓN: Si habla español, tiene a jacob disposición servicios gratuitos de asistencia lingüística. Llame al [phone].    We comply with applicable federal civil rights laws and [place] laws. We do not discriminate on the basis of race, color, national origin, age, disability, sex, sexual orientation, or gender identity.            Thank you!     Thank you for choosing EYE CLINIC  for your care. Our goal is always to provide you with excellent care. Hearing back from our patients is one way we can continue to improve our services. Please take a few minutes to complete the written survey that you may receive in the mail after your visit with us. Thank you!             Your Updated Medication List - Protect others around you: Learn how to safely use, store and throw away your medicines at www.disposemymeds.org.          This list is accurate as of 3/30/18 11:29 AM.  Always use your most recent med list.                   Brand Name Dispense  Instructions for use Diagnosis    * acetaZOLAMIDE 500 MG 12 hr capsule    DIAMOX SEQUEL    60 capsule    Take 1 capsule (500 mg) by mouth 2 times daily    Primary open angle glaucoma of both eyes, severe stage       * acetaZOLAMIDE 500 MG 12 hr capsule    DIAMOX SEQUEL    60 capsule    Take 1 capsule (500 mg) by mouth 2 times daily    Primary open angle glaucoma of both eyes, severe stage       brimonidine 0.2 % ophthalmic solution    ALPHAGAN    15 mL    1 drop 2x/day (12 hours apart) in BOTH EYES.    Primary open angle glaucoma of both eyes, moderate stage       bromfenac 0.09 % ophthalmic solution    XIBROM    5 mL    Place 1 drop into both eyes 2 times daily    Primary open angle glaucoma of both eyes, severe stage       * dorzolamide-timolol 2-0.5 % ophthalmic solution    COSOPT    3 Bottle    Place 1 drop into both eyes every 12 hours    Primary open angle glaucoma of both eyes, moderate stage       * dorzolamide-timolol PF 22.3-6.8 MG/ML opthalmic solution    COSOPT PF    60 each    Place 1 drop into both eyes 2 times daily 12 hrs apart    Primary open angle glaucoma of both eyes, moderate stage       gatifloxacin 0.5 % ophthalmic solution    ZYMAXID     1 drop        latanoprost 0.005 % ophthalmic solution    XALATAN    7.5 mL    Place 1 drop into both eyes At Bedtime    Primary open angle glaucoma of both eyes, moderate stage       SIMVASTATIN PO           * Notice:  This list has 4 medication(s) that are the same as other medications prescribed for you. Read the directions carefully, and ask your doctor or other care provider to review them with you.

## 2018-03-30 NOTE — PROGRESS NOTES
1)POAG -- s/p Istent OS (11/27/17), s/p Bleb Revision OD (5/31/17), s/p Trab OD (4/24/17), s/p SLT OD (1/10 and 6/14) -- K pachy:531/562 Tmax: 29/28 HVF: OD:[place] (prog in 2018), Superior and Inferior arcuate defects (prog in 2017) and Superior arcuate stable from 4696-2775 with possible progression in 2014 and OD:Sup arcuate with IN dec sens in 2016 and OS:Fluct  CDR:0.9/0.5 HRT/OCT: OD:Mod RNFL thinning with ?prog in 2016 and OS:RNFL WNL FHX of Glc: No Gonio: open Intolerant to: Asthma/COPD: No, tolerating topical BB Steroid Use: Yes Kidney Stones: No Sulfa Allergy: No IOP targets: OD:M-Hteens and OS:Hteens-L20s  --  rec tube OD if IOP remains above target off steroid  2)H/O Ch Nevus OS  3)PCIOL OU  4)S/p evacuation of bilateral subdural hematoma 7/2/16 -- H/O CTs and MRI at [facility] in 2016  5)H/O Bleb Leak OD s/p Bleb revision -- resolved with BCTL -- remains joaquina negative  6)H/O Uveitis OD -- Quiescent  7)CME OD -- following with Dr. Schaeffer    S/p LSL of all sutures    MD:pt leaving on April 2 for 2 weeks    MD:pt just started Cosopt yesterday -- pt now using Diamox 500mg Sequel qHS only as he can not tolerate BID dosing    Patient will continue on Cosopt (Timolol/Dorzolamide) which is a blue top drop 2x/day (12 hours apart) in BOTH EYES, Latanoprost which is a teal top drop at bedtime in BOTH EYES, and Alphagan (Brimonidine) which is a purple top drop 2x/day (12 hours apart) in BOTH EYES.   Patient will also continue Diamox 500mg Sequels 2x/day (12 hours apart) if able to tolerate.  Patient will continue to follow up with Dr. Schaeffer.  A long discussion of the risks, benefits, and alternatives including potential treatment and management options were had with patient and a decision was made to proceed with tube surgery in the right eye.      Attending Physician Attestation:  Complete documentation of historical and exam elements from today's encounter can be found in the full encounter summary report (not  reduplicated in this progress note). I personally obtained the chief complaint(s) and history of present illness.  I confirmed and edited as necessary the review of systems, past medical/surgical history, family history, social history, and examination findings as documented by others; and I examined the patient myself. I personally reviewed the relevant tests, images, and reports as documented above. I formulated and edited as necessary the assessment and plan and discussed the findings and management plan with the patient and family.  - Glenys Green MD

## 2018-03-30 NOTE — PATIENT INSTRUCTIONS
Patient will continue on Cosopt (Timolol/Dorzolamide) which is a blue top drop 2x/day (12 hours apart) in BOTH EYES, Latanoprost which is a teal top drop at bedtime in BOTH EYES, and Alphagan (Brimonidine) which is a purple top drop 2x/day (12 hours apart) in BOTH EYES.   Patient will also continue Diamox 500mg Sequels 2x/day (12 hours apart) if able to tolerate.  Patient will continue to follow up with Dr. Schaeffer.  A long discussion of the risks, benefits, and alternatives including potential treatment and management options were had with patient and a decision was made to proceed with tube surgery in the right eye.

## 2018-03-30 NOTE — NURSING NOTE
Chief Complaints and History of Present Illnesses   Patient presents with     Glaucoma Follow Up     2 month follow up both eyes     HPI    Affected eye(s):  Both   Symptoms:     No floaters   No flashes   No redness   Tearing   No Dryness   No itching         Do you have eye pain now?:  No      Comments:  Pt states vision has been stable since last visit. Pt notes soreness in RLL, tenderness to touch.    Brian JOSE March 30, 2018 10:20 AM

## 2018-04-02 ENCOUNTER — TELEPHONE (OUTPATIENT)
Dept: OPHTHALMOLOGY | Facility: CLINIC | Age: 78
End: 2018-04-02

## 2018-04-23 ENCOUNTER — TRANSFERRED RECORDS (OUTPATIENT)
Dept: HEALTH INFORMATION MANAGEMENT | Facility: CLINIC | Age: 78
End: 2018-04-23

## 2018-05-01 ENCOUNTER — ANESTHESIA EVENT (OUTPATIENT)
Dept: SURGERY | Facility: AMBULATORY SURGERY CENTER | Age: 78
End: 2018-05-01

## 2018-05-02 ENCOUNTER — SURGERY (OUTPATIENT)
Age: 78
End: 2018-05-02

## 2018-05-02 ENCOUNTER — ANESTHESIA (OUTPATIENT)
Dept: SURGERY | Facility: AMBULATORY SURGERY CENTER | Age: 78
End: 2018-05-02

## 2018-05-02 ENCOUNTER — HOSPITAL ENCOUNTER (OUTPATIENT)
Facility: AMBULATORY SURGERY CENTER | Age: 78
End: 2018-05-02
Attending: OPHTHALMOLOGY
Payer: COMMERCIAL

## 2018-05-02 VITALS
HEIGHT: 72 IN | SYSTOLIC BLOOD PRESSURE: 113 MMHG | HEART RATE: 48 BPM | TEMPERATURE: 97.5 F | RESPIRATION RATE: 16 BRPM | BODY MASS INDEX: 24.38 KG/M2 | WEIGHT: 180 LBS | OXYGEN SATURATION: 99 % | DIASTOLIC BLOOD PRESSURE: 63 MMHG

## 2018-05-02 DIAGNOSIS — H40.1133 PRIMARY OPEN ANGLE GLAUCOMA OF BOTH EYES, SEVERE STAGE: Primary | ICD-10-CM

## 2018-05-02 DEVICE — EYE IMP GRAFT VISIONGRAFT CORNEA 1/2MOON 9MM CO301AL-90: Type: IMPLANTABLE DEVICE | Site: EYE | Status: FUNCTIONAL

## 2018-05-02 DEVICE — EYE IMP VALVE AHMED FLEXIBLE FP7: Type: IMPLANTABLE DEVICE | Site: EYE | Status: FUNCTIONAL

## 2018-05-02 RX ORDER — ONDANSETRON 2 MG/ML
4 INJECTION INTRAMUSCULAR; INTRAVENOUS EVERY 30 MIN PRN
Status: DISCONTINUED | OUTPATIENT
Start: 2018-05-02 | End: 2018-05-03 | Stop reason: HOSPADM

## 2018-05-02 RX ORDER — DEXAMETHASONE SODIUM PHOSPHATE 4 MG/ML
INJECTION, SOLUTION INTRA-ARTICULAR; INTRALESIONAL; INTRAMUSCULAR; INTRAVENOUS; SOFT TISSUE PRN
Status: DISCONTINUED | OUTPATIENT
Start: 2018-05-02 | End: 2018-05-02 | Stop reason: HOSPADM

## 2018-05-02 RX ORDER — NALOXONE HYDROCHLORIDE 0.4 MG/ML
.1-.4 INJECTION, SOLUTION INTRAMUSCULAR; INTRAVENOUS; SUBCUTANEOUS
Status: DISCONTINUED | OUTPATIENT
Start: 2018-05-02 | End: 2018-05-03 | Stop reason: HOSPADM

## 2018-05-02 RX ORDER — PROPOFOL 10 MG/ML
INJECTION, EMULSION INTRAVENOUS PRN
Status: DISCONTINUED | OUTPATIENT
Start: 2018-05-02 | End: 2018-05-02

## 2018-05-02 RX ORDER — BALANCED SALT SOLUTION 6.4; .75; .48; .3; 3.9; 1.7 MG/ML; MG/ML; MG/ML; MG/ML; MG/ML; MG/ML
SOLUTION OPHTHALMIC PRN
Status: DISCONTINUED | OUTPATIENT
Start: 2018-05-02 | End: 2018-05-02 | Stop reason: HOSPADM

## 2018-05-02 RX ORDER — SODIUM CHLORIDE, SODIUM LACTATE, POTASSIUM CHLORIDE, CALCIUM CHLORIDE 600; 310; 30; 20 MG/100ML; MG/100ML; MG/100ML; MG/100ML
INJECTION, SOLUTION INTRAVENOUS CONTINUOUS
Status: DISCONTINUED | OUTPATIENT
Start: 2018-05-02 | End: 2018-05-02 | Stop reason: HOSPADM

## 2018-05-02 RX ORDER — NEOMYCIN SULFATE, POLYMYXIN B SULFATE, AND DEXAMETHASONE 3.5; 10000; 1 MG/G; [USP'U]/G; MG/G
OINTMENT OPHTHALMIC PRN
Status: DISCONTINUED | OUTPATIENT
Start: 2018-05-02 | End: 2018-05-02 | Stop reason: HOSPADM

## 2018-05-02 RX ORDER — SODIUM CHLORIDE, SODIUM LACTATE, POTASSIUM CHLORIDE, CALCIUM CHLORIDE 600; 310; 30; 20 MG/100ML; MG/100ML; MG/100ML; MG/100ML
INJECTION, SOLUTION INTRAVENOUS CONTINUOUS
Status: DISCONTINUED | OUTPATIENT
Start: 2018-05-02 | End: 2018-05-03 | Stop reason: HOSPADM

## 2018-05-02 RX ORDER — ONDANSETRON 4 MG/1
4 TABLET, ORALLY DISINTEGRATING ORAL EVERY 30 MIN PRN
Status: DISCONTINUED | OUTPATIENT
Start: 2018-05-02 | End: 2018-05-03 | Stop reason: HOSPADM

## 2018-05-02 RX ORDER — ACETAMINOPHEN 325 MG/1
975 TABLET ORAL ONCE
Status: COMPLETED | OUTPATIENT
Start: 2018-05-02 | End: 2018-05-02

## 2018-05-02 RX ADMIN — ACETAMINOPHEN 975 MG: 325 TABLET ORAL at 07:45

## 2018-05-02 RX ADMIN — SODIUM CHLORIDE, SODIUM LACTATE, POTASSIUM CHLORIDE, CALCIUM CHLORIDE: 600; 310; 30; 20 INJECTION, SOLUTION INTRAVENOUS at 08:32

## 2018-05-02 RX ADMIN — Medication 5 ML: at 08:50

## 2018-05-02 RX ADMIN — DEXAMETHASONE SODIUM PHOSPHATE 4 MG: 4 INJECTION, SOLUTION INTRA-ARTICULAR; INTRALESIONAL; INTRAMUSCULAR; INTRAVENOUS; SOFT TISSUE at 08:48

## 2018-05-02 RX ADMIN — SODIUM CHLORIDE, SODIUM LACTATE, POTASSIUM CHLORIDE, CALCIUM CHLORIDE: 600; 310; 30; 20 INJECTION, SOLUTION INTRAVENOUS at 08:25

## 2018-05-02 RX ADMIN — NEOMYCIN SULFATE, POLYMYXIN B SULFATE, AND DEXAMETHASONE 1 APPLICATOR: 3.5; 10000; 1 OINTMENT OPHTHALMIC at 08:48

## 2018-05-02 RX ADMIN — PROPOFOL 50 MG: 10 INJECTION, EMULSION INTRAVENOUS at 08:34

## 2018-05-02 RX ADMIN — PROPOFOL 20 MG: 10 INJECTION, EMULSION INTRAVENOUS at 08:37

## 2018-05-02 RX ADMIN — BALANCED SALT SOLUTION 15 ML: 6.4; .75; .48; .3; 3.9; 1.7 SOLUTION OPHTHALMIC at 08:47

## 2018-05-02 ASSESSMENT — LIFESTYLE VARIABLES: TOBACCO_USE: 1

## 2018-05-02 NOTE — ANESTHESIA POSTPROCEDURE EVALUATION
Patient: Rusty Harris    Procedure(s):  Right Eye Ahmed Tube Insertion - Wound Class: I-Clean    Diagnosis:Right Eye Glaucoma  Diagnosis Additional Information: No value filed.    Anesthesia Type:  MAC    Note:  Anesthesia Post Evaluation    Patient location during evaluation: Phase 2  Patient participation: Able to fully participate in evaluation  Level of consciousness: awake and alert  Pain management: adequate  Airway patency: patent  Cardiovascular status: acceptable  Respiratory status: acceptable  Hydration status: acceptable  PONV: none     Anesthetic complications: None          Last vitals:  Vitals:    05/02/18 0719 05/02/18 0928 05/02/18 0945   BP: 115/67 112/67 113/63   Pulse: (!) 48     Resp: 16 16 16   Temp: 36.4  C (97.5  F) 36.4  C (97.5  F) 36.4  C (97.5  F)   SpO2: 98% 97% 99%         Electronically Signed By: Colby Otero MD  May 2, 2018  10:07 AM

## 2018-05-02 NOTE — IP AVS SNAPSHOT
MRN:[id number]                      After Visit Summary   5/2/2018    Rusty Harris    MRN: [id number]           Thank you!     Thank you for choosing [facility] for your care. Our goal is always to provide you with excellent care. Hearing back from our patients is one way we can continue to improve our services. Please take a few minutes to complete the written survey that you may receive in the mail after you visit with us. Thank you!        Patient Information     Date Of Birth          1940        About your hospital stay     You were admitted on:  May 2, 2018 [facility]    You were discharged on:  May 2, 2018       Who to Call     For medical emergencies, please call 911.  For non-urgent questions about your medical care, please call your primary care provider or clinic, [phone]  For questions related to your surgery, please call your surgery clinic        Attending Provider     Provider Specialty    Glenys Green MD Ophthalmology       Primary Care Provider Office Phone # Fax #    Gustabo Melendez [phone]      After Care Instructions     Eye drops as prescribed by physician.  Start drops today unless told otherwise by the physician           May use Tylenol or Advil for pain as directed by the physician           Notify Physician if you have severe headache or nausea           Remove patch per physician instruction           Return to clinic as instructed by physician           Wear eye shield or patch as directed                 Your next 10 appointments already scheduled     May 03, 2018 10:30 AM CDT   Post-Op with Glenys Green MD   Eye Clinic ([facility])    [facility] [address] [facility] [address] [place] [id number]   [phone]            May 10, 2018 10:30 AM CDT   Post-Op with Glenys Green MD   Eye Clinic ([facility])    [facility]  "[address]  9[address] Clin [address]  [facility] [id number] [phone]              Further instructions from your care team         Home Care Following Anesthesia  For 24 hours after surgery:  Get plenty of rest.  A responsible adult must stay with you for at least 24 hours after you leave the surgery center.  Do not drive or use heavy equipment.  If you have weakness or tingling, don't drive or use heavy equipment until this feeling goes away.   Do not drink alcohol.   Avoid strenuous or risky activities.  Ask for help when climbing stairs.  You may feel lightheaded.  IF so, sit for a few minutes before standing.  Have someone help you get up.   If you have nausea (feel sick to your stomach): Drink only clear liquids such as apple juice, ginger ale, broth or 7-Up.  Rest may also help.  Be sure to drink enough fluids.  Move to a regular diet as you feel able.   You may have a slight fever.  Call the doctor if your fever is over 100 F (37.7 C) (taken under the tongue) or lasts longer than 24 hours.  You may have a dry mouth, a sore throat, muscle aches or trouble sleeping. These should go away after 24 hours.  Do not make important or legal decisions.        Today you received a Marcaine or bupivacaine block to numb the nerves near your surgery site.  This is a block using local anesthetic or \"numbing\" medication injected around the nerves to anesthetize or \"numb\" the area supplied by those nerves.  This block is injected into the muscle layer near your surgical site.  The medication may numb the location where you had surgery for 6-18 hours, but may last up to 24 hours.  If your surgical site is an arm or leg you should be careful with your affected limb, since it is possible to injure your limb without being aware of it due to the numbing.  Until full feeling returns, you should guard against bumping or hitting your limb, and avoid extreme hot or cold temperatures on the skin.  As the " block wears off, the feeling will return as a tingling or prickly sensation near your surgical site.  You will experience more discomfort from your incision as the feeling returns.  You may want to take a pain pill (a narcotic or Tylenol if this was prescribed by your surgeon) when you start to experience mild pain before the pain beccomes more severe.  If your pain medications do not control your pain you should notifiy your surgeon.    Tips for taking pain medications  To get the best pain relief possible, remember these points:  Take pain medications as directed, before pain becomes severe.  Pain medication can upset your stomach: taking it with food may help.  Constipation is a common side effect of pain medication. Drink plenty of  fluids.  Eat foods high in fiber. Take a stool softener if recommended by your doctor or pharmacist.  Do not drink alcohol, drive or operate machinery while taking pain medications.  Ask about other ways to control pain, such as with heat, ice or relaxation.    Tylenol/Acetaminophen Consumption  To help encourage the safe use of acetaminophen, the makers of TYLENOL  have lowered the maximum daily dose for single-ingredient Extra Strength TYLENOL  (acetaminophen) products sold in the [place] from 8 pills per day (4,000 mg) to 6 pills per day (3,000 mg). The dosing interval has also changed from 2 pills every 4-6 hours to 2 pills every 6 hours.  If you feel your pain relief is insufficient, you may take Tylenol/Acetaminophen in addition to your narcotic pain medication.   Be careful not to exceed 3,000 mg of Tylenol/Acetaminophen in a 24 hour period from all sources.  If you are taking extra strength Tylenol/acetaminophen (500 mg), the maximum dose is 6 tablets in 24 hours.  If you are taking regular strength acetaminophen (325 mg), the maximum dose is 9 tablets in 24 hours.    Call a doctor for any of the following:  Signs of infection (fever, growing tenderness at the surgery site, a  "large amount of drainage or bleeding, severe pain, foul-smelling drainage, redness, swelling).  It has been over 8 to 10 hours since surgery and you are still not able to urinate (pass water).  Headache for over 24 hours.    Your doctor is:       Dr. Glenys Green, Opthalmology: [phone]               Or dial [phone] and ask for the resident on call for:  Ophthalmology  For emergency care, call the:  [place]:  [phone] (TTY for hearing impaired: [phone])      Discharge Instructions after Eye Surgery (Dr. Glenys Green & Dr. Bia Rinaldi)      Take your post-operative drops according to the instructions    Wear a shield at bedtime    For tube and cataract surgery wear your shield for one week    For trabeculectomy surgery wear your shield for two weeks    Proper placement of the shield: place the bump of the shield on the bridge of your nose, resting the shield on the orbital bones. Secure the shield with one piece of tape, from forehead to cheek.    Approved activities (OK to do the following):    Please clean around the eye(s) gently with tissue or washcloth    Leaning over the sink to brush your teeth    Going up and down stairs    Walking for exercise    Watching TV or reading    After your clinic appointment tomorrow, you may shower, including putting your head under the shower, making sure to close your eyes      Restricted activities:     No bending such as \"toe-touching\"    Keep head above level of heart    Sit down to put on shoes    No heavy lifting (10 pound restriction, equal to the weight of a gallon of milk)    Do not push or rub eye      Call for any problems or questions [phone]    There is always someone on call, even on weekends.    Pending Results     No orders found from 4/30/2018 to 5/3/2018.            Admission Information     Date & Time Provider Department Dept. Phone    5/2/2018 Glenys Green MD [facility] " [phone]      Your Vitals Were     Blood Pressure Pulse Temperature Respirations Height Weight    112/67 48 97.5  F (36.4  C) (Oral) 16 1.829 m (6') 81.6 kg (180 lb)    Pulse Oximetry BMI (Body Mass Index)                97% 24.41 kg/m2          MyChart Information     KAYAK is an electronic gateway that provides easy, online access to your medical records. With KAYAK, you can request a clinic appointment, read your test results, renew a prescription or communicate with your care team.     To sign up for KAYAK visit the website at [email or web address]   You will be asked to enter the access code listed below, as well as some personal information. Please follow the directions to create your username and password.     Your access code is: [email or web address]  Expires: 2018  7:30 AM     Your access code will  in 90 days. If you need help or a new code, please contact your [facility] or call [phone] for assistance.        Care EveryWhere ID     This is your Care EveryWhere ID. This could be used by other organizations to access your [facility] medical records  [id number]        Equal Access to Services     HELENE IBRAHIM AH: Sue Quintanilla, waestevan duckworth, qabarby kaalmada balaji, jackie browning. So [facility] [phone].    ATENCIÓN: Si habla español, tiene a jacob disposición servicios gratuitos de asistencia lingüística. Llame al [phone].    We comply with applicable federal civil rights laws and [place] laws. We do not discriminate on the basis of race, color, national origin, age, disability, sex, sexual orientation, or gender identity.               Review of your medicines      CONTINUE these medicines which have NOT CHANGED        Dose / Directions    brimonidine 0.2 % ophthalmic solution   Commonly known as:  ALPHAGAN   Used for:  Primary open angle glaucoma of both eyes, moderate stage        1 drop 2x/day (12  hours apart) in BOTH EYES.   Quantity:  15 mL   Refills:  3       bromfenac 0.09 % ophthalmic solution   Commonly known as:  XIBROM   Used for:  Primary open angle glaucoma of both eyes, severe stage        Dose:  1 drop   Place 1 drop into both eyes 2 times daily   Quantity:  5 mL   Refills:  11       * dorzolamide-timolol 2-0.5 % ophthalmic solution   Commonly known as:  COSOPT   Used for:  Primary open angle glaucoma of both eyes, moderate stage        Dose:  1 drop   Place 1 drop into both eyes every 12 hours   Quantity:  3 Bottle   Refills:  1       * dorzolamide-timolol PF 22.3-6.8 MG/ML opthalmic solution   Commonly known as:  COSOPT PF   Used for:  Primary open angle glaucoma of both eyes, moderate stage        Dose:  1 drop   Place 1 drop into both eyes 2 times daily 12 hrs apart   Quantity:  60 each   Refills:  4       gatifloxacin 0.5 % ophthalmic solution   Commonly known as:  ZYMAXID        Dose:  1 drop   1 drop   Refills:  0       latanoprost 0.005 % ophthalmic solution   Commonly known as:  XALATAN   Used for:  Primary open angle glaucoma of both eyes, moderate stage        Dose:  1 drop   Place 1 drop into both eyes At Bedtime   Quantity:  7.5 mL   Refills:  11       SIMVASTATIN PO        Dose:  20 mg   Take 20 mg by mouth At Bedtime   Refills:  0       * Notice:  This list has 2 medication(s) that are the same as other medications prescribed for you. Read the directions carefully, and ask your doctor or other care provider to review them with you.      STOP taking     acetaZOLAMIDE 500 MG 12 hr capsule   Commonly known as:  DIAMOX SEQUEL                    Protect others around you: Learn how to safely use, store and throw away your medicines at www.disposemymeds.org.             Medication List: This is a list of all your medications and when to take them. Check marks below indicate your daily home schedule. Keep this list as a reference.      Medications           Morning Afternoon Evening  Bedtime As Needed    brimonidine 0.2 % ophthalmic solution   Commonly known as:  ALPHAGAN   1 drop 2x/day (12 hours apart) in BOTH EYES.                                bromfenac 0.09 % ophthalmic solution   Commonly known as:  XIBROM   Place 1 drop into both eyes 2 times daily                                * dorzolamide-timolol 2-0.5 % ophthalmic solution   Commonly known as:  COSOPT   Place 1 drop into both eyes every 12 hours                                * dorzolamide-timolol PF 22.3-6.8 MG/ML opthalmic solution   Commonly known as:  COSOPT PF   Place 1 drop into both eyes 2 times daily 12 hrs apart                                gatifloxacin 0.5 % ophthalmic solution   Commonly known as:  ZYMAXID   1 drop                                latanoprost 0.005 % ophthalmic solution   Commonly known as:  XALATAN   Place 1 drop into both eyes At Bedtime                                SIMVASTATIN PO   Take 20 mg by mouth At Bedtime                                * Notice:  This list has 2 medication(s) that are the same as other medications prescribed for you. Read the directions carefully, and ask your doctor or other care provider to review them with you.

## 2018-05-02 NOTE — ANESTHESIA PREPROCEDURE EVALUATION
Anesthesia Evaluation     . Pt has had prior anesthetic.     No history of anesthetic complications          ROS/MED HX    ENT/Pulmonary: Comment: Glaucoma    (+)tobacco use, Current use , . .    Neurologic:     (+)CVA without deficits    Cardiovascular:     (+) Dyslipidemia, ----. : . . . :. .       METS/Exercise Tolerance:  >4 METS   Hematologic:  - neg hematologic  ROS       Musculoskeletal:  - neg musculoskeletal ROS       GI/Hepatic:  - neg GI/hepatic ROS       Renal/Genitourinary:  - ROS Renal section negative       Endo:  - neg endo ROS       Psychiatric:  - neg psychiatric ROS       Infectious Disease:  - neg infectious disease ROS       Malignancy:      - no malignancy   Other:    - neg other ROS                 Physical Exam  Normal systems: cardiovascular and pulmonary    Airway   Mallampati: II  TM distance: >3 FB  Neck ROM: full    Dental   (+) partials    Cardiovascular       Pulmonary                     Anesthesia Plan      History & Physical Review  History and physical reviewed and following examination; no interval change.    ASA Status:  2 .    NPO Status:  > 6 hours    Plan for MAC with Intravenous and Propofol induction. Maintenance will be TIVA.  Reason for MAC:  Procedure to face, neck, head or breast and Deep or markedly invasive procedure (G8)  PONV prophylaxis:  Ondansetron (or other 5HT-3)       Postoperative Care  Postoperative pain management:  IV analgesics.      Consents  Anesthetic plan, risks, benefits and alternatives discussed with:  Patient..                  History and physical assessed; Patient examined.   Risks and alternatives presented and discussed. Patient and family agree. All questions answered.      Colby Otero MD  Staff Anesthesiologist  *[phone]

## 2018-05-02 NOTE — DISCHARGE INSTRUCTIONS
"  Home Care Following Anesthesia  For 24 hours after surgery:  Get plenty of rest.  A responsible adult must stay with you for at least 24 hours after you leave the surgery center.  Do not drive or use heavy equipment.  If you have weakness or tingling, don't drive or use heavy equipment until this feeling goes away.   Do not drink alcohol.   Avoid strenuous or risky activities.  Ask for help when climbing stairs.  You may feel lightheaded.  IF so, sit for a few minutes before standing.  Have someone help you get up.   If you have nausea (feel sick to your stomach): Drink only clear liquids such as apple juice, ginger ale, broth or 7-Up.  Rest may also help.  Be sure to drink enough fluids.  Move to a regular diet as you feel able.   You may have a slight fever.  Call the doctor if your fever is over 100 F (37.7 C) (taken under the tongue) or lasts longer than 24 hours.  You may have a dry mouth, a sore throat, muscle aches or trouble sleeping. These should go away after 24 hours.  Do not make important or legal decisions.        Today you received a Marcaine or bupivacaine block to numb the nerves near your surgery site.  This is a block using local anesthetic or \"numbing\" medication injected around the nerves to anesthetize or \"numb\" the area supplied by those nerves.  This block is injected into the muscle layer near your surgical site.  The medication may numb the location where you had surgery for 6-18 hours, but may last up to 24 hours.  If your surgical site is an arm or leg you should be careful with your affected limb, since it is possible to injure your limb without being aware of it due to the numbing.  Until full feeling returns, you should guard against bumping or hitting your limb, and avoid extreme hot or cold temperatures on the skin.  As the block wears off, the feeling will return as a tingling or prickly sensation near your surgical site.  You will experience more discomfort from your incision as " the feeling returns.  You may want to take a pain pill (a narcotic or Tylenol if this was prescribed by your surgeon) when you start to experience mild pain before the pain beccomes more severe.  If your pain medications do not control your pain you should notifiy your surgeon.    Tips for taking pain medications  To get the best pain relief possible, remember these points:  Take pain medications as directed, before pain becomes severe.  Pain medication can upset your stomach: taking it with food may help.  Constipation is a common side effect of pain medication. Drink plenty of  fluids.  Eat foods high in fiber. Take a stool softener if recommended by your doctor or pharmacist.  Do not drink alcohol, drive or operate machinery while taking pain medications.  Ask about other ways to control pain, such as with heat, ice or relaxation.    Tylenol/Acetaminophen Consumption  To help encourage the safe use of acetaminophen, the makers of TYLENOL  have lowered the maximum daily dose for single-ingredient Extra Strength TYLENOL  (acetaminophen) products sold in the [place] from 8 pills per day (4,000 mg) to 6 pills per day (3,000 mg). The dosing interval has also changed from 2 pills every 4-6 hours to 2 pills every 6 hours.  If you feel your pain relief is insufficient, you may take Tylenol/Acetaminophen in addition to your narcotic pain medication.   Be careful not to exceed 3,000 mg of Tylenol/Acetaminophen in a 24 hour period from all sources.  If you are taking extra strength Tylenol/acetaminophen (500 mg), the maximum dose is 6 tablets in 24 hours.  If you are taking regular strength acetaminophen (325 mg), the maximum dose is 9 tablets in 24 hours.    Call a doctor for any of the following:  Signs of infection (fever, growing tenderness at the surgery site, a large amount of drainage or bleeding, severe pain, foul-smelling drainage, redness, swelling).  It has been over 8 to 10 hours since surgery and you are still not  "able to urinate (pass water).  Headache for over 24 hours.    Your doctor is:       Dr. Glenys Green, Opthalmology: [phone]               Or dial [phone] and ask for the resident on call for:  Ophthalmology  For emergency care, call the:  [facility]:  [phone] (TTY for hearing impaired: [phone])      Discharge Instructions after Eye Surgery (Dr. Glenys Green & Dr. Bia Rinaldi)      Take your post-operative drops according to the instructions    Wear a shield at bedtime    For tube and cataract surgery wear your shield for one week    For trabeculectomy surgery wear your shield for two weeks    Proper placement of the shield: place the bump of the shield on the bridge of your nose, resting the shield on the orbital bones. Secure the shield with one piece of tape, from forehead to cheek.    Approved activities (OK to do the following):    Please clean around the eye(s) gently with tissue or washcloth    Leaning over the sink to brush your teeth    Going up and down stairs    Walking for exercise    Watching TV or reading    After your clinic appointment tomorrow, you may shower, including putting your head under the shower, making sure to close your eyes      Restricted activities:     No bending such as \"toe-touching\"    Keep head above level of heart    Sit down to put on shoes    No heavy lifting (10 pound restriction, equal to the weight of a gallon of milk)    Do not push or rub eye      Call for any problems or questions [phone]    There is always someone on call, even on weekends.  "

## 2018-05-02 NOTE — IP AVS SNAPSHOT
[facility]    [address] [place] [id number]    Phone:  [phone]    Fax:  [phone]                                       After Visit Summary   5/2/2018    Rusty Harris    MRN: [id number]           After Visit Summary Signature Page     I have received my discharge instructions, and my questions have been answered. I have discussed any challenges I see with this plan with the nurse or doctor.    ..........................................................................................................................................  Patient/Patient Representative Signature      ..........................................................................................................................................  Patient Representative Print Name and Relationship to Patient    ..................................................               ................................................  Date                                            Time    ..........................................................................................................................................  Reviewed by Signature/Title    ...................................................              ..............................................  Date                                                            Time

## 2018-05-02 NOTE — OP NOTE
Pre-operative diagnosis: Right Eye Glaucoma   Post-operative diagnosis Same   Procedure:    Anesthesia: Procedure(s):  Ahmed Tube Insertion, Right  Retrobulbar Block   Surgeon: Glenys Green MD   Assistants(s): None   Estimated blood loss: Minimal                         Specimens: None   Findings:  Complications: None  None       Indication: Patient was noted to have glaucoma with uncontrolled intraocular pressures despite maximally tolerated medical therapy.       After informed consent was obtained, the patient was wheeled to the operative suite. Preoperatively the patient received a retrobulbar block consisting of a 1:1 mixture of 2% Lidocaine and 0.75% Bupivicaine under propofol anesthesia. Patient was then prepped and draped in the usual sterile fashion. A lid speculum was placed between the right upper and lower eyelids. A 0.12 forceps and Vannas scissors were used to create a conjunctival peritomy extending from the 12 o'clock to 9 o'clock locations. A Tess scissors and nontoothed forceps were then used to dissect free the tenons attachments.  Two relaxing conjunctival incisions were created at the 12 o'clock and 9 o'clock locations using the Tess scissors and non-toothed forceps. Hemostasis was maintained with wet-field electrocautery. A juan's tenotomy scissors and non-toothed forceps were used to enlarge the pocket in the superotemporal quadrant. Next, an Ahmed valve implant, model FP7, was obtained. A tube inserting forceps and BSS on a 27 gauge cannula were then used to prime the implant and it was found to be in good working order. A caliper was used to measure a location 8mm posterior to the limbus in the superotemporal quadrant. Two simple 8-0 nylon interrupted sutures were used to adhere the plate to the episclera in the superotemporal quadrant 8mm posterior to the limbus. The tube was once again checked using a tube inserting forceps and BSS on a 27 gauge cannula and it was found to  be in good working order. A 0.12 forceps and 1.0mm sideport blade were then used to make a paracentesis inferotemporally through the clear cornea. Viscoat was injected into the anterior chamber.  A tube inserting forceps and Vannas scissors were then used to cut the tube to the appropriate length with a bevel up configuration. A 23 gauge needle and 0.12 forceps were then used to make a tunnel into the anterior chamber from approximately 1-2mm posterior to the limbus. The tube inserting forceps and 0.12 forceps were then used to insert the tube into the anterior chamber. The tube was noted to lie in good position just anterior the iris plane. A 10-0 nylon suture was then used to adhere the tube to the episclera. Visiongraft cornea patch graft was then adhered to the episclera using two simple 10-0 nylon interrupted sutures at the leading edges of the graft. The graft was noted to cover the tube well. The conjunctiva was reapproximated to the limbus using 2 simple 10-0 nylon interrupted sutures. The conjunctiva was reapproximated back to itself in the superior and temporal quadrants using a 7-0 vicryl running suture. At the conclusion of the case the patient received subconjunctival injections of dexamethasone and cefazolin.  The patient then had Maxitrol ophthalmic ointment applied to the ocular surface of the right eye as well as a pressure patch and gamez shield.   The patient was wheeled to the recovery area in stable condition.

## 2018-05-02 NOTE — ANESTHESIA CARE TRANSFER NOTE
Patient: Rusty Harris    Procedure(s):  Right Eye Ahmed Tube Insertion - Wound Class: I-Clean    Diagnosis: Right Eye Glaucoma  Diagnosis Additional Information: No value filed.    Anesthesia Type:   MAC     Note:  Airway :Room Air  Patient transferred to:Phase II  Comments: Patient awake and breathing spont. VSS. No complaints of pain or nausea. Report to RNHandoff Report: Identifed the Patient, Identified the Reponsible Provider, Reviewed the pertinent medical history, Discussed the surgical course, Reviewed Intra-OP anesthesia mangement and issues during anesthesia, Set expectations for post-procedure period and Allowed opportunity for questions and acknowledgement of understanding      Vitals: (Last set prior to Anesthesia Care Transfer)    CRNA VITALS  5/2/2018 0851 - 5/2/2018 0930      5/2/2018             Pulse: (!)  42    Ht Rate: (!)  41    SpO2: 100 %    Resp Rate (set): 10                Electronically Signed By: LUISA Combs CRNA  May 2, 2018  9:30 AM

## 2018-05-03 ENCOUNTER — OFFICE VISIT (OUTPATIENT)
Dept: OPHTHALMOLOGY | Facility: CLINIC | Age: 78
End: 2018-05-03
Attending: OPHTHALMOLOGY
Payer: MEDICARE

## 2018-05-03 DIAGNOSIS — H40.1133 PRIMARY OPEN ANGLE GLAUCOMA OF BOTH EYES, SEVERE STAGE: Primary | ICD-10-CM

## 2018-05-03 PROCEDURE — G0463 HOSPITAL OUTPT CLINIC VISIT: HCPCS | Mod: ZF

## 2018-05-03 RX ORDER — CIPROFLOXACIN HYDROCHLORIDE 3.5 MG/ML
1 SOLUTION/ DROPS TOPICAL 4 TIMES DAILY
Qty: 1 BOTTLE | Refills: 0 | Status: SHIPPED | OUTPATIENT
Start: 2018-05-03 | End: 2018-06-21

## 2018-05-03 RX ORDER — PREDNISOLONE ACETATE 10 MG/ML
1 SUSPENSION/ DROPS OPHTHALMIC
Qty: 15 ML | Refills: 3 | Status: SHIPPED | OUTPATIENT
Start: 2018-05-03 | End: 2018-09-06

## 2018-05-03 ASSESSMENT — VISUAL ACUITY
OS_SC+: -3
OD_SC: 20/70
OD_SC+: -1
OS_SC: 20/25
METHOD: SNELLEN - LINEAR
OD_PH_SC: 20/60-1

## 2018-05-03 ASSESSMENT — EXTERNAL EXAM - LEFT EYE: OS_EXAM: NORMAL

## 2018-05-03 ASSESSMENT — TONOMETRY
OS_IOP_MMHG: 12
OD_IOP_MMHG: 07
IOP_METHOD: APPLANATION

## 2018-05-03 ASSESSMENT — SLIT LAMP EXAM - LIDS: COMMENTS: MGD, BLEPHARITIS

## 2018-05-03 ASSESSMENT — EXTERNAL EXAM - RIGHT EYE: OD_EXAM: NORMAL

## 2018-05-03 NOTE — NURSING NOTE
Chief Complaints and History of Present Illnesses   Patient presents with     Follow Up For     Right Eye Ahmed Tube Insertion 05/02/2018     HPI    Affected eye(s):  Right   Symptoms:     Blurred vision   Decreased vision         Do you have eye pain now?:  No      Comments:  No pain last night or this morning.  Catherine PERALTA 11:02 AM May 3, 2018

## 2018-05-03 NOTE — PATIENT INSTRUCTIONS
Patient will continue on Cosopt (Timolol/Dorzolamide) which is a blue top drop 2x/day (12 hours apart) in the LEFT EYE ONLY, Latanoprost which is a teal top drop at bedtime in the LEFT EYE ONLY and Alphagan (Brimonidine) which is a purple top drop 2x/day (12 hours apart) in the LEFT EYE ONLY.   Patient will also discontinue and hold onto the Diamox 500mg Sequels.  Patient will continue to follow up with Dr. Schaeffer.   No heavy lifting, bending, stooping, straining, rubbing the eye, or getting water in the eye.  Please wear protective eyewear over the eye at all times including glasses during the day and the protective shield at bedtime.  Patient will return to clinic in 1 week with repeat evaluation.    Use the following drops (RIGHT EYE ONLY):  Antibiotic --  Ciprofloxacin (tan top): 4x/day until finished (use for at least 5-7 days after surgery)    Steroid -- Pred Forte/Prednisolone Acetate: every 2 hours while awake    Please be sure to call if you have any decrease in vision, increase in pain, flashing lights, redness, or a lot of drainage.

## 2018-05-03 NOTE — PROGRESS NOTES
1)POAG -- s/p Tube OD (5/2/18), s/p Istent OS (11/27/17), s/p Bleb Revision OD (5/31/17), s/p Trab OD (4/24/17), s/p SLT OD (1/10 and 6/14) -- K pachy:531/562 Tmax: 29/28 HVF: OD:Central island (prog in 2018), Superior and Inferior arcuate defects (prog in 2017) and Superior arcuate stable from 1654-3748 with possible progression in 2014 and OD:Sup arcuate with IN dec sens in 2016 and OS:Fluct  CDR:0.9/0.5 HRT/OCT: OD:Mod RNFL thinning with ?prog in 2016 and OS:RNFL WNL FHX of Glc: No Gonio: open Intolerant to: Asthma/COPD: No, tolerating topical BB Steroid Use: Yes Kidney Stones: No Sulfa Allergy: No IOP targets: OD:M-Hteens and OS:Hteens-L20s  --   2)H/O Ch Nevus OS  3)PCIOL OU  4)S/p evacuation of bilateral subdural hematoma 7/2/16 -- H/O CTs and MRI at [facility] in 2016  5)H/O Bleb Leak OD s/p Bleb revision -- resolved with BCTL -- remains joaquina negative  6)H/O Uveitis OD -- Quiescent  7)CME OD -- following with Dr. Schaeffer      Patient will continue on Cosopt (Timolol/Dorzolamide) which is a blue top drop 2x/day (12 hours apart) in the LEFT EYE ONLY, Latanoprost which is a teal top drop at bedtime in the LEFT EYE ONLY and Alphagan (Brimonidine) which is a purple top drop 2x/day (12 hours apart) in the LEFT EYE ONLY.   Patient will also discontinue and hold onto the Diamox 500mg Sequels.  Patient will continue to follow up with Dr. Schaeffer.   No heavy lifting, bending, stooping, straining, rubbing the eye, or getting water in the eye.  Please wear protective eyewear over the eye at all times including glasses during the day and the protective shield at bedtime.  Patient will return to clinic in 1 week with repeat evaluation.    Use the following drops (RIGHT EYE ONLY):  Antibiotic --  Ciprofloxacin (tan top): 4x/day until finished (use for at least 5-7 days after surgery)    Steroid -- Pred Forte/Prednisolone Acetate: every 2 hours while awake    Please be sure to call if you have any decrease in vision, increase in  pain, flashing lights, redness, or a lot of drainage.      Resident Note:  POD #1 s/p Ahmed right eye   IOP 7 today  Start ciprofloxacin four times daily right eye   Start Pred Forte q2h right eye   Discontinue glaucoma gtt right eye   Return to clinic in 1 week    Alfred Thorne MD  Ophthalmology, PGY-3    Attending Physician Attestation:  Complete documentation of historical and exam elements from today's encounter can be found in the full encounter summary report (not reduplicated in this progress note). I personally obtained the chief complaint(s) and history of present illness.  I confirmed and edited as necessary the review of systems, past medical/surgical history, family history, social history, and examination findings as documented by others; and I examined the patient myself. I personally reviewed the relevant tests, images, and reports as documented above. I formulated and edited as necessary the assessment and plan and discussed the findings and management plan with the patient and family.  - Glenys Green MD

## 2018-05-07 ENCOUNTER — TELEPHONE (OUTPATIENT)
Dept: OPHTHALMOLOGY | Facility: CLINIC | Age: 78
End: 2018-05-07

## 2018-05-07 NOTE — TELEPHONE ENCOUNTER
[facility] Call Center    Phone Message    May a detailed message be left on voicemail: yes    Reason for Call: Other: Patient would like to reschedule his POST-OP visit with Dr. Green.      Action Taken: Message routed to:  [facility] Eye

## 2018-05-10 ENCOUNTER — OFFICE VISIT (OUTPATIENT)
Dept: OPHTHALMOLOGY | Facility: CLINIC | Age: 78
End: 2018-05-10
Attending: OPHTHALMOLOGY
Payer: MEDICARE

## 2018-05-10 DIAGNOSIS — Z96.1 PSEUDOPHAKIA OF BOTH EYES: ICD-10-CM

## 2018-05-10 DIAGNOSIS — H40.1133 PRIMARY OPEN ANGLE GLAUCOMA OF BOTH EYES, SEVERE STAGE: Primary | ICD-10-CM

## 2018-05-10 PROCEDURE — G0463 HOSPITAL OUTPT CLINIC VISIT: HCPCS | Mod: ZF

## 2018-05-10 ASSESSMENT — TONOMETRY
OD_IOP_MMHG: 9
OS_IOP_MMHG: 20
IOP_METHOD: APPLANATION

## 2018-05-10 ASSESSMENT — EXTERNAL EXAM - LEFT EYE: OS_EXAM: NORMAL

## 2018-05-10 ASSESSMENT — VISUAL ACUITY
METHOD: SNELLEN - LINEAR
OD_SC: 20/50
OS_SC: 20/25
OD_SC+: +2

## 2018-05-10 ASSESSMENT — EXTERNAL EXAM - RIGHT EYE: OD_EXAM: NORMAL

## 2018-05-10 ASSESSMENT — CONF VISUAL FIELD
OS_NORMAL: 1
OD_INFERIOR_TEMPORAL_RESTRICTION: 3
OD_SUPERIOR_NASAL_RESTRICTION: 1
OD_SUPERIOR_TEMPORAL_RESTRICTION: 3
OD_INFERIOR_NASAL_RESTRICTION: 1

## 2018-05-10 ASSESSMENT — SLIT LAMP EXAM - LIDS: COMMENTS: MGD, BLEPHARITIS

## 2018-05-10 NOTE — MR AVS SNAPSHOT
After Visit Summary   5/10/2018    Rusty Harris    MRN: [id number]           Patient Information     Date Of Birth          1940        Visit Information        Provider Department      5/10/2018 7:45 AM Glenys Green MD [facility]        Today's Diagnoses     Primary open angle glaucoma of both eyes, severe stage    -  1    Pseudophakia of both eyes          Care Instructions    Patient will continue on Cosopt (Timolol/Dorzolamide) which is a blue top drop 2x/day (12 hours apart) in the LEFT EYE ONLY, Latanoprost which is a teal top drop at bedtime in the LEFT EYE ONLY and Alphagan (Brimonidine) which is a purple top drop 2x/day (12 hours apart) in the LEFT EYE ONLY.   Patient will also discontinue and hold onto the Diamox 500mg Sequels.  Patient will continue to follow up with Dr. Schaeffer.   No heavy lifting, bending, stooping, straining, rubbing the eye, or getting water in the eye.  Please wear protective eyewear over the eye at all times including glasses during the day and the protective shield at bedtime.  Patient will return to clinic in 3-4 weeks with repeat evaluation.    Use the following drops (RIGHT EYE ONLY):  Antibiotic --  Ciprofloxacin (tan top): Discontinue    Steroid -- Pred Forte/Prednisolone Acetate: every 2 hours while awake for 2 weeks tehn 4x/day until seen    Please be sure to call if you have any decrease in vision, increase in pain, flashing lights, redness, or a lot of drainage.              Follow-ups after your visit        Follow-up notes from your care team     Return  3-4 weeks with repeat evaluation.      Who to contact     Please call your clinic at [phone] to:    Ask questions about your health    Make or cancel appointments    Discuss your medicines    Learn about your test results    Speak to your doctor            Additional Information About Your Visit        [employer] Information     [employer]ur is an electronic gateway that provides easy, online  access to your medical records. With [employer], you can request a clinic appointment, read your test results, renew a prescription or communicate with your care team.     To sign up for [employer] visit the website at [email or web address]   You will be asked to enter the access code listed below, as well as some personal information. Please follow the directions to create your username and password.     Your access code is: [email or web address]  Expires: 2018  7:30 AM     Your access code will  in 90 days. If you need help or a new code, please contact your [facility] or call [phone] for assistance.        Care EveryWhere ID     This is your Care EveryWhere ID. This could be used by other organizations to access your [place] medical records  [id number]         Blood Pressure from Last 3 Encounters:   18 113/63    Weight from Last 3 Encounters:   18 81.6 kg (180 lb)              Today, you had the following     No orders found for display       Primary Care Provider Office Phone # Fax #    Gustabo Melendez [phone]       [address] [address]        Equal Access to Services     HELENE IBRAHIM AH: Hadii tamera velarde hadasho Soomaali, waaxda luqadaha, qaybta kaalmada adeegyada, jackie amadoin levar browning. So [facility] [phone].    ATENCIÓN: Si habla español, tiene a jacob disposición servicios gratuitos de asistencia lingüística. Llame al [phone].    We comply with applicable federal civil rights laws and [place] laws. We do not discriminate on the basis of race, color, national origin, age, disability, sex, sexual orientation, or gender identity.            Thank you!     Thank you for choosing [facility]  for your care. Our goal is always to provide you with excellent care. Hearing back from our patients is one way we can continue to improve our services. Please take a few minutes to complete  the written survey that you may receive in the mail after your visit with us. Thank you!             Your Updated Medication List - Protect others around you: Learn how to safely use, store and throw away your medicines at www.disposemymeds.org.          This list is accurate as of 5/10/18  8:19 AM.  Always use your most recent med list.                   Brand Name Dispense Instructions for use Diagnosis    brimonidine 0.2 % ophthalmic solution    ALPHAGAN    15 mL    1 drop 2x/day (12 hours apart) in BOTH EYES.    Primary open angle glaucoma of both eyes, moderate stage       bromfenac 0.09 % ophthalmic solution    XIBROM    5 mL    Place 1 drop into both eyes 2 times daily    Primary open angle glaucoma of both eyes, severe stage       ciprofloxacin 0.3 % ophthalmic solution    CILOXAN    1 Bottle    Place 1 drop into the right eye 4 times daily    Primary open angle glaucoma of both eyes, severe stage       * dorzolamide-timolol 2-0.5 % ophthalmic solution    COSOPT    3 Bottle    Place 1 drop into both eyes every 12 hours    Primary open angle glaucoma of both eyes, moderate stage       * dorzolamide-timolol PF 22.3-6.8 MG/ML opthalmic solution    COSOPT PF    60 each    Place 1 drop into both eyes 2 times daily 12 hrs apart    Primary open angle glaucoma of both eyes, moderate stage       gatifloxacin 0.5 % ophthalmic solution    ZYMAXID     1 drop        latanoprost 0.005 % ophthalmic solution    XALATAN    7.5 mL    Place 1 drop into both eyes At Bedtime    Primary open angle glaucoma of both eyes, moderate stage       prednisoLONE acetate 1 % ophthalmic susp    PRED FORTE    15 mL    Place 1 drop into the right eye every 2 hours    Primary open angle glaucoma of both eyes, severe stage       SIMVASTATIN PO      Take 20 mg by mouth At Bedtime        * Notice:  This list has 2 medication(s) that are the same as other medications prescribed for you. Read the directions carefully, and ask your doctor or other  care provider to review them with you.

## 2018-05-10 NOTE — PATIENT INSTRUCTIONS
Patient will continue on Cosopt (Timolol/Dorzolamide) which is a blue top drop 2x/day (12 hours apart) in the LEFT EYE ONLY, Latanoprost which is a teal top drop at bedtime in the LEFT EYE ONLY and Alphagan (Brimonidine) which is a purple top drop 2x/day (12 hours apart) in the LEFT EYE ONLY.   Patient will also discontinue and hold onto the Diamox 500mg Sequels.  Patient will continue to follow up with Dr. Schaeffer.   No heavy lifting, bending, stooping, straining, rubbing the eye, or getting water in the eye.  Please wear protective eyewear over the eye at all times including glasses during the day and the protective shield at bedtime.  Patient will return to clinic in 3-4 weeks with repeat evaluation.    Use the following drops (RIGHT EYE ONLY):  Antibiotic --  Ciprofloxacin (tan top): Discontinue    Steroid -- Pred Forte/Prednisolone Acetate: every 2 hours while awake for 2 weeks tehn 4x/day until seen    Please be sure to call if you have any decrease in vision, increase in pain, flashing lights, redness, or a lot of drainage.

## 2018-05-10 NOTE — NURSING NOTE
Chief Complaints and History of Present Illnesses   Patient presents with     Post Op (Ophthalmology) Left Eye     s/p Tube OD (5/2/18),      HPI    Affected eye(s):  Left   Symptoms:     Blurred vision   Decreased vision   No floaters   No flashes      Duration:  1 week   Frequency:  Constant       Do you have eye pain now?:  No      Comments:   Cosopt 2x/day  LEFT EYE ONLY, Latanoprost bedtime in the LEFT EYE ONLY and Alphagan (Brimonidine)  2x/day  LEFT EYE ONLY.   Prednisolone 4 times a day to RE.  Oflox 4 times a day to RE.      Alice Gandara COT 7:57 AM 05/10/2018

## 2018-05-10 NOTE — PROGRESS NOTES
1)POAG -- s/p Tube OD (5/2/18), s/p Istent OS (11/27/17), s/p Bleb Revision OD (5/31/17), s/p Trab OD (4/24/17), s/p SLT OD (1/10 and 6/14) -- K pachy:531/562 Tmax: 29/28 HVF: OD:Central island (prog in 2018), Superior and Inferior arcuate defects (prog in 2017) and Superior arcuate stable from 5530-6153 with possible progression in 2014 and OD:Sup arcuate with IN dec sens in 2016 and OS:Fluct  CDR:0.9/0.5 HRT/OCT: OD:Mod RNFL thinning with ?prog in 2016 and OS:RNFL WNL FHX of Glc: No Gonio: open Intolerant to: Asthma/COPD: No, tolerating topical BB Steroid Use: Yes Kidney Stones: No Sulfa Allergy: No IOP targets: OD:M-Hteens and OS:Hteens-L20s  --   2)H/O Ch Nevus OS  3)PCIOL OU  4)S/p evacuation of bilateral subdural hematoma 7/2/16 -- H/O CTs and MRI at [facility] in 2016  5)H/O Bleb Leak OD s/p Bleb revision -- resolved with BCTL -- remains joaquina negative  6)H/O Uveitis OD -- Quiescent  7)CME OD -- following with Dr. Schaeffer      Patient will continue on Cosopt (Timolol/Dorzolamide) which is a blue top drop 2x/day (12 hours apart) in the LEFT EYE ONLY, Latanoprost which is a teal top drop at bedtime in the LEFT EYE ONLY and Alphagan (Brimonidine) which is a purple top drop 2x/day (12 hours apart) in the LEFT EYE ONLY.   Patient will also discontinue and hold onto the Diamox 500mg Sequels.  Patient will continue to follow up with Dr. Schaeffer.   No heavy lifting, bending, stooping, straining, rubbing the eye, or getting water in the eye.  Please wear protective eyewear over the eye at all times including glasses during the day and the protective shield at bedtime.  Patient will return to clinic in 3-4 weeks with repeat evaluation.    Use the following drops (RIGHT EYE ONLY):  Antibiotic --  Ciprofloxacin (tan top): Discontinue    Steroid -- Pred Forte/Prednisolone Acetate: every 2 hours while awake for 2 weeks then 4x/day until seen    Please be sure to call if you have any decrease in vision, increase in pain, flashing  lights, redness, or a lot of drainage.        Attending Physician Attestation:  Complete documentation of historical and exam elements from today's encounter can be found in the full encounter summary report (not reduplicated in this progress note). I personally obtained the chief complaint(s) and history of present illness.  I confirmed and edited as necessary the review of systems, past medical/surgical history, family history, social history, and examination findings as documented by others; and I examined the patient myself. I personally reviewed the relevant tests, images, and reports as documented above. I formulated and edited as necessary the assessment and plan and discussed the findings and management plan with the patient and family.  - Glenys Green MD

## 2018-06-21 ENCOUNTER — OFFICE VISIT (OUTPATIENT)
Dept: OPHTHALMOLOGY | Facility: CLINIC | Age: 78
End: 2018-06-21
Attending: OPHTHALMOLOGY
Payer: MEDICARE

## 2018-06-21 DIAGNOSIS — H40.1133 PRIMARY OPEN ANGLE GLAUCOMA OF BOTH EYES, SEVERE STAGE: Primary | ICD-10-CM

## 2018-06-21 PROCEDURE — G0463 HOSPITAL OUTPT CLINIC VISIT: HCPCS | Mod: ZF

## 2018-06-21 ASSESSMENT — TONOMETRY
IOP_METHOD: APPLANATION
OD_IOP_MMHG: 20
IOP_METHOD: TONOPEN
OD_IOP_MMHG: 20
OS_IOP_MMHG: 13
OS_IOP_MMHG: 14

## 2018-06-21 ASSESSMENT — CONF VISUAL FIELD
OD_INFERIOR_TEMPORAL_RESTRICTION: 3
OD_SUPERIOR_NASAL_RESTRICTION: 1
OS_NORMAL: 1
OD_SUPERIOR_TEMPORAL_RESTRICTION: 3
OD_INFERIOR_NASAL_RESTRICTION: 1

## 2018-06-21 ASSESSMENT — SLIT LAMP EXAM - LIDS: COMMENTS: MGD, BLEPHARITIS

## 2018-06-21 ASSESSMENT — EXTERNAL EXAM - RIGHT EYE: OD_EXAM: NORMAL

## 2018-06-21 ASSESSMENT — REFRACTION_WEARINGRX
OS_AXIS: 010
OS_CYLINDER: +1.50
OD_ADD: +2.75
OS_ADD: +2.75
OS_SPHERE: -0.50
OD_CYLINDER: +1.25
OD_SPHERE: -0.50
OD_AXIS: 008

## 2018-06-21 ASSESSMENT — EXTERNAL EXAM - LEFT EYE: OS_EXAM: NORMAL

## 2018-06-21 ASSESSMENT — VISUAL ACUITY
OD_SC+: +1
OS_SC: 20/30
OD_SC: 20/30
METHOD: SNELLEN - LINEAR

## 2018-06-21 NOTE — PROGRESS NOTES
1)POAG -- s/p Tube OD (5/2/18), s/p Istent OS (11/27/17), s/p Bleb Revision OD (5/31/17), s/p Trab OD (4/24/17), s/p SLT OD (1/10 and 6/14) -- K pachy:531/562 Tmax: 29/28 HVF: OD:Central island (prog in 2018), Superior and Inferior arcuate defects (prog in 2017) and Superior arcuate stable from 7396-1935 with possible progression in 2014 and OD:Sup arcuate with IN dec sens in 2016 and OS:Fluct  CDR:0.9/0.5 HRT/OCT: OD:Mod RNFL thinning with ?prog in 2016 and OS:RNFL WNL FHX of Glc: No Gonio: open Intolerant to: Asthma/COPD: No, tolerating topical BB Steroid Use: Yes Kidney Stones: No Sulfa Allergy: No IOP targets: OD:M-Hteens and OS:Hteens-L20s  --   2)H/O Ch Nevus OS  3)PCIOL OU c PCO OS -- could consider yag cap OS in future   4)S/p evacuation of bilateral subdural hematoma 7/2/16 -- H/O CTs and MRI at [facility] in 2016  5)H/O Bleb Leak OD s/p Bleb revision -- resolved with BCTL -- remains joaquina negative  6)H/O Uveitis OD -- Quiescent  7)CME OD -- following with Dr. Schaeffer  8)JAIRO -- rec AT      Patient will continue on Cosopt (Timolol/Dorzolamide) which is a blue top drop 2x/day (12 hours apart) in BOTH EYES, Latanoprost which is a teal top drop at bedtime in the LEFT EYE ONLY and Alphagan (Brimonidine) which is a purple top drop 2x/day (12 hours apart) in the LEFT EYE ONLY.   Patient will also discontinue and hold onto the Diamox 500mg Sequels.  Patient will continue to follow up with Dr. Schaeffer.   No heavy lifting, bending, stooping, straining, rubbing the eye, or getting water in the eye.  Please wear protective eyewear over the eye at all times including glasses during the day and the protective shield at bedtime.  Patient will return to clinic in 1-2 months with repeat evaluation.    Use the following drops (RIGHT EYE ONLY):  Antibiotic --  Ciprofloxacin (tan top): Discontinue    Steroid -- Pred Forte/Prednisolone Acetate: 3x/day until seen    Please be sure to call if you have any decrease in vision, increase in  pain, flashing lights, redness, or a lot of drainage.      Resident Note:  IOP slightly elevated above goal right eye   Possible mild steroid response  Start timolol qAM right eye   Continue present management left eye   Start pred forte taper right eye   Return to clinic in 3-4 weeks    Alfred Thorne MD  Ophthalmology, PGY-3    Attending Physician Attestation:  Complete documentation of historical and exam elements from today's encounter can be found in the full encounter summary report (not reduplicated in this progress note). I personally obtained the chief complaint(s) and history of present illness.  I confirmed and edited as necessary the review of systems, past medical/surgical history, family history, social history, and examination findings as documented by others; and I examined the patient myself. I personally reviewed the relevant tests, images, and reports as documented above. I formulated and edited as necessary the assessment and plan and discussed the findings and management plan with the patient and family.  - Glenys Green MD

## 2018-06-21 NOTE — MR AVS SNAPSHOT
After Visit Summary   6/21/2018    Rusty Harrsi    MRN: [id number]           Patient Information     Date Of Birth          1940        Visit Information        Provider Department      6/21/2018 7:30 AM [facility] Green [facility]        Today's Diagnoses     Primary open angle glaucoma of both eyes, severe stage    -  1      Care Instructions    Patient will continue on Cosopt (Timolol/Dorzolamide) which is a blue top drop 2x/day (12 hours apart) in BOTH EYES, Latanoprost which is a teal top drop at bedtime in the LEFT EYE ONLY and Alphagan (Brimonidine) which is a purple top drop 2x/day (12 hours apart) in the LEFT EYE ONLY.   Patient will also discontinue and hold onto the Diamox 500mg Sequels.  Patient will continue to follow up with Dr. Schaeffer.   No heavy lifting, bending, stooping, straining, rubbing the eye, or getting water in the eye.  Please wear protective eyewear over the eye at all times including glasses during the day and the protective shield at bedtime.  Patient will return to clinic in 1-2 months with repeat evaluation.    Use the following drops (RIGHT EYE ONLY):  Antibiotic --  Ciprofloxacin (tan top): Discontinue    Steroid -- Pred Forte/Prednisolone Acetate: 3x/day until seen    Please be sure to call if you have any decrease in vision, increase in pain, flashing lights, redness, or a lot of drainage.            Follow-ups after your visit        Follow-up notes from your care team     Return 1-2 months with repeat evaluation..      Who to contact     Please call your clinic at [phone] to:    Ask questions about your health    Make or cancel appointments    Discuss your medicines    Learn about your test results    Speak to your doctor            Additional Information About Your Visit        [facility] Information     [facility] is an electronic gateway that provides easy, online access to your medical records. With [facility], you can request a clinic appointment, read  your test results, renew a prescription or communicate with your care team.     To sign up for [facility] visit the website at [email or web address]   You will be asked to enter the access code listed below, as well as some personal information. Please follow the directions to create your username and password.     Your access code is: [email or web address]  Expires: 2018  6:30 AM     Your access code will  in 90 days. If you need help or a new code, please contact your [facility] or call [phone] for assistance.        Care EveryWhere ID     This is your Care EveryWhere ID. This could be used by other organizations to access your [place] medical records  [id number]         Blood Pressure from Last 3 Encounters:   18 113/63    Weight from Last 3 Encounters:   18 81.6 kg (180 lb)              Today, you had the following     No orders found for display       Primary Care Provider Office Phone # Fax #    Gustabo Melendez [phone]       [address] [address]        Equal Access to Services     [facility] : Hadii aad ku hadasho Soomaali, waaxda luqadaha, qaybta kaalmada adeegyada, jackie watters . So [facility] [phone].    ATENCIÓN: Si habla español, tiene a jacob disposición servicios gratuitos de asistencia lingüística. Llame al [phone].    We comply with applicable federal civil rights laws and [place] laws. We do not discriminate on the basis of race, color, national origin, age, disability, sex, sexual orientation, or gender identity.            Thank you!     Thank you for choosing [facility]  for your care. Our goal is always to provide you with excellent care. Hearing back from our patients is one way we can continue to improve our services. Please take a few minutes to complete the written survey that you may receive in the mail after your visit with us. Thank  you!             Your Updated Medication List - Protect others around you: Learn how to safely use, store and throw away your medicines at www.disposemymeds.org.          This list is accurate as of 6/21/18  8:42 AM.  Always use your most recent med list.                   Brand Name Dispense Instructions for use Diagnosis    brimonidine 0.2 % ophthalmic solution    ALPHAGAN    15 mL    1 drop 2x/day (12 hours apart) in BOTH EYES.    Primary open angle glaucoma of both eyes, moderate stage       bromfenac 0.09 % ophthalmic solution    XIBROM    5 mL    Place 1 drop into both eyes 2 times daily    Primary open angle glaucoma of both eyes, severe stage       * dorzolamide-timolol 2-0.5 % ophthalmic solution    COSOPT    3 Bottle    Place 1 drop into both eyes every 12 hours    Primary open angle glaucoma of both eyes, moderate stage       * dorzolamide-timolol PF 22.3-6.8 MG/ML opthalmic solution    COSOPT PF    60 each    Place 1 drop into both eyes 2 times daily 12 hrs apart    Primary open angle glaucoma of both eyes, moderate stage       gatifloxacin 0.5 % ophthalmic solution    ZYMAXID     1 drop        latanoprost 0.005 % ophthalmic solution    XALATAN    7.5 mL    Place 1 drop into both eyes At Bedtime    Primary open angle glaucoma of both eyes, moderate stage       prednisoLONE acetate 1 % ophthalmic susp    PRED FORTE    15 mL    Place 1 drop into the right eye every 2 hours    Primary open angle glaucoma of both eyes, severe stage       SIMVASTATIN PO      Take 20 mg by mouth At Bedtime        * Notice:  This list has 2 medication(s) that are the same as other medications prescribed for you. Read the directions carefully, and ask your doctor or other care provider to review them with you.

## 2018-06-21 NOTE — NURSING NOTE
Chief Complaints and History of Present Illnesses   Patient presents with     Glaucoma Follow Up     6 week follow up both eyes.     HPI    Affected eye(s):  Both   Symptoms:     No floaters   No flashes   No redness   No Dryness   No itching         Do you have eye pain now?:  No      Comments:  Pt states vision is about the same as last visit. Pt seeing better with brighter lighting. Pt also notes that vision improves as the day goes on.    Brian JOSE June 21, 2018 7:38 AM

## 2018-06-21 NOTE — PATIENT INSTRUCTIONS
Patient will continue on Cosopt (Timolol/Dorzolamide) which is a blue top drop 2x/day (12 hours apart) in BOTH EYES, Latanoprost which is a teal top drop at bedtime in the LEFT EYE ONLY and Alphagan (Brimonidine) which is a purple top drop 2x/day (12 hours apart) in the LEFT EYE ONLY.   Patient will also discontinue and hold onto the Diamox 500mg Sequels.  Patient will continue to follow up with Dr. Schaeffer.   No heavy lifting, bending, stooping, straining, rubbing the eye, or getting water in the eye.  Please wear protective eyewear over the eye at all times including glasses during the day and the protective shield at bedtime.  Patient will return to clinic in 1-2 months with repeat evaluation.    Use the following drops (RIGHT EYE ONLY):  Antibiotic --  Ciprofloxacin (tan top): Discontinue    Steroid -- Pred Forte/Prednisolone Acetate: 3x/day until seen    Please be sure to call if you have any decrease in vision, increase in pain, flashing lights, redness, or a lot of drainage.

## 2018-07-26 ENCOUNTER — OFFICE VISIT (OUTPATIENT)
Dept: OPHTHALMOLOGY | Facility: CLINIC | Age: 78
End: 2018-07-26
Attending: OPHTHALMOLOGY
Payer: MEDICARE

## 2018-07-26 DIAGNOSIS — H40.1133 PRIMARY OPEN ANGLE GLAUCOMA OF BOTH EYES, SEVERE STAGE: Primary | ICD-10-CM

## 2018-07-26 PROCEDURE — G0463 HOSPITAL OUTPT CLINIC VISIT: HCPCS | Mod: ZF

## 2018-07-26 ASSESSMENT — PACHYMETRY
OS_CT(UM): 533
OD_CT(UM): 532

## 2018-07-26 ASSESSMENT — CONF VISUAL FIELD
OD_SUPERIOR_TEMPORAL_RESTRICTION: 3
OD_INFERIOR_TEMPORAL_RESTRICTION: 3
OD_SUPERIOR_NASAL_RESTRICTION: 1
OS_NORMAL: 1
METHOD: COUNTING FINGERS
OD_INFERIOR_NASAL_RESTRICTION: 1

## 2018-07-26 ASSESSMENT — SLIT LAMP EXAM - LIDS: COMMENTS: MGD, BLEPHARITIS

## 2018-07-26 ASSESSMENT — CUP TO DISC RATIO
OS_RATIO: 0.5
OD_RATIO: 0.9

## 2018-07-26 ASSESSMENT — TONOMETRY
IOP_METHOD: APPLANATION
OD_IOP_MMHG: 20
OS_IOP_MMHG: 15
IOP_METHOD: APPLANATION
OD_IOP_MMHG: 19
OS_IOP_MMHG: 16

## 2018-07-26 ASSESSMENT — VISUAL ACUITY
OS_CC: 20/30
OD_CC: 20/30
OD_CC+: -1
OD_PH_CC: 20/25-2
METHOD: SNELLEN - LINEAR
OS_CC+: -1

## 2018-07-26 ASSESSMENT — EXTERNAL EXAM - LEFT EYE: OS_EXAM: NORMAL

## 2018-07-26 ASSESSMENT — EXTERNAL EXAM - RIGHT EYE: OD_EXAM: NORMAL

## 2018-07-26 NOTE — NURSING NOTE
Chief Complaints and History of Present Illnesses   Patient presents with     Glaucoma Follow Up     1 month f/u Primary open angle glaucoma of both eyes     HPI    Affected eye(s):  Both   Symptoms:           Do you have eye pain now?:  No      Comments:  Pt states not changes since last visit 1 month ago.    Vielka Cantu@ [facility] 2:10 PM July 26, 2018

## 2018-07-26 NOTE — MR AVS SNAPSHOT
After Visit Summary   7/26/2018    Rusty Harris    MRN: [id number]           Patient Information     Date Of Birth          1940        Visit Information        Provider Department      7/26/2018 1:30 PM [facility]        Today's Diagnoses     Primary open angle glaucoma of both eyes, severe stage    -  1      Care Instructions    Patient will continue on Cosopt (Timolol/Dorzolamide) which is a blue top drop 2x/day (12 hours apart) in BOTH EYES, Latanoprost which is a teal top drop at bedtime in the LEFT EYE ONLY and Alphagan (Brimonidine) which is a purple top drop 2x/day (12 hours apart) in the LEFT EYE ONLY.   Patient will also discontinue and hold onto the Diamox 500mg Sequels.  Patient will continue to follow up with Dr. Schaeffer.   No heavy lifting, bending, stooping, straining, rubbing the eye, or getting water in the eye.  Please wear protective eyewear over the eye at all times including glasses during the day and the protective shield at bedtime.  Patient will return to clinic in 1-2 months with repeat evaluation, visual field test (OD:LVC and OS:24-2), dilated eye exam and disc photos.  .    Use the following drops (RIGHT EYE ONLY):  Antibiotic --  Ciprofloxacin (tan top): Discontinue    Steroid -- Pred Forte/Prednisolone Acetate: 2x/day for 2 weeks then 1x/day for 2 weeks then discontinue    Please be sure to call if you have any decrease in vision, increase in pain, flashing lights, redness, or a lot of drainage.            Follow-ups after your visit        Follow-up notes from your care team     Return 1-2 months with repeat evaluation, visual field test (OD:LVC and OS:24-2), dilated eye exam and disc.      Who to contact     Please call your clinic at [phone] to:    Ask questions about your health    Make or cancel appointments    Discuss your medicines    Learn about your test results    Speak to your doctor            Additional Information About Your  Visit        Care EveryWhere ID     This is your Care EveryWhere ID. This could be used by other organizations to access your [place] medical records  [id number]         Blood Pressure from Last 3 Encounters:   05/02/18 113/63    Weight from Last 3 Encounters:   05/02/18 81.6 kg (180 lb)              Today, you had the following     No orders found for display       Primary Care Provider Office Phone # Fax #    Gustabo Melendez [phone]       [address] [address]        Equal Access to Services     [facility]: Hadii aad ku hadasho Soomaali, waaxda luqadaha, qaybta kaalmada adeegyada, waxcurtis john haylilliann april watters . So [facility] [phone].    ATENCIÓN: Si habla español, tiene a jacob disposición servicios gratuitos de asistencia lingüística. [facility] [phone].    We comply with applicable federal civil rights laws and [place] laws. We do not discriminate on the basis of race, color, national origin, age, disability, sex, sexual orientation, or gender identity.            Thank you!     Thank you for choosing [facility]  for your care. Our goal is always to provide you with excellent care. Hearing back from our patients is one way we can continue to improve our services. Please take a few minutes to complete the written survey that you may receive in the mail after your visit with us. Thank you!             Your Updated Medication List - Protect others around you: Learn how to safely use, store and throw away your medicines at www.disposemymeds.org.          This list is accurate as of 7/26/18  3:01 PM.  Always use your most recent med list.                   Brand Name Dispense Instructions for use Diagnosis    brimonidine 0.2 % ophthalmic solution    ALPHAGAN    15 mL    1 drop 2x/day (12 hours apart) in BOTH EYES.    Primary open angle glaucoma of both eyes, moderate stage       bromfenac 0.09 % ophthalmic solution    XIBROM    5 mL     Place 1 drop into both eyes 2 times daily    Primary open angle glaucoma of both eyes, severe stage       * dorzolamide-timolol 2-0.5 % ophthalmic solution    COSOPT    3 Bottle    Place 1 drop into both eyes every 12 hours    Primary open angle glaucoma of both eyes, moderate stage       * dorzolamide-timolol PF 22.3-6.8 MG/ML opthalmic solution    COSOPT PF    60 each    Place 1 drop into both eyes 2 times daily 12 hrs apart    Primary open angle glaucoma of both eyes, moderate stage       gatifloxacin 0.5 % ophthalmic solution    ZYMAXID     1 drop        latanoprost 0.005 % ophthalmic solution    XALATAN    7.5 mL    Place 1 drop into both eyes At Bedtime    Primary open angle glaucoma of both eyes, moderate stage       prednisoLONE acetate 1 % ophthalmic susp    PRED FORTE    15 mL    Place 1 drop into the right eye every 2 hours    Primary open angle glaucoma of both eyes, severe stage       SIMVASTATIN PO      Take 20 mg by mouth At Bedtime        * Notice:  This list has 2 medication(s) that are the same as other medications prescribed for you. Read the directions carefully, and ask your doctor or other care provider to review them with you.

## 2018-07-26 NOTE — PATIENT INSTRUCTIONS
Patient will continue on Cosopt (Timolol/Dorzolamide) which is a blue top drop 2x/day (12 hours apart) in BOTH EYES, Latanoprost which is a teal top drop at bedtime in the LEFT EYE ONLY and Alphagan (Brimonidine) which is a purple top drop 2x/day (12 hours apart) in the LEFT EYE ONLY.   Patient will also discontinue and hold onto the Diamox 500mg Sequels.  Patient will continue to follow up with Dr. Schaeffer.   No heavy lifting, bending, stooping, straining, rubbing the eye, or getting water in the eye.  Please wear protective eyewear over the eye at all times including glasses during the day and the protective shield at bedtime.  Patient will return to clinic in 1-2 months with repeat evaluation, visual field test (OD:LVC and OS:24-2), dilated eye exam and disc photos.  .    Use the following drops (RIGHT EYE ONLY):  Antibiotic --  Ciprofloxacin (tan top): Discontinue    Steroid -- Pred Forte/Prednisolone Acetate: 2x/day for 2 weeks then 1x/day for 2 weeks then discontinue    Please be sure to call if you have any decrease in vision, increase in pain, flashing lights, redness, or a lot of drainage.

## 2018-07-26 NOTE — PROGRESS NOTES
1)POAG -- s/p Tube OD (5/2/18), s/p Istent OS (11/27/17), s/p Bleb Revision OD (5/31/17), s/p Trab OD (4/24/17), s/p SLT OD (1/10 and 6/14) -- K pachy:531/562 Tmax: 29/28 HVF: OD:Central island (prog in 2018), Superior and Inferior arcuate defects (prog in 2017) and Superior arcuate stable from 6323-3737 with possible progression in 2014 and OD:Sup arcuate with IN dec sens in 2016 and OS:Fluct  CDR:0.9/0.5 HRT/OCT: OD:Mod RNFL thinning with ?prog in 2016 and OS:RNFL WNL FHX of Glc: No Gonio: open Intolerant to: Asthma/COPD: No, tolerating topical BB Steroid Use: Yes Kidney Stones: No Sulfa Allergy: No IOP targets: OD:M-Hteens and OS:Hteens-L20s  --   2)H/O Ch Nevus OS  3)PCIOL OU c PCO OS -- could consider yag cap OS in future   4)S/p evacuation of bilateral subdural hematoma 7/2/16 -- H/O CTs and MRI at [facility] in 2016  5)H/O Bleb Leak OD s/p Bleb revision -- resolved with BCTL -- remains joaquina negative  6)H/O Uveitis OD -- Quiescent  7)CME OD -- following with Dr. Schaeffer  8)JAIRO -- rec AT      Patient will continue on Cosopt (Timolol/Dorzolamide) which is a blue top drop 2x/day (12 hours apart) in BOTH EYES, Latanoprost which is a teal top drop at bedtime in the LEFT EYE ONLY and Alphagan (Brimonidine) which is a purple top drop 2x/day (12 hours apart) in the LEFT EYE ONLY.   Patient will also discontinue and hold onto the Diamox 500mg Sequels.  Patient will continue to follow up with Dr. Schaeffer.   No heavy lifting, bending, stooping, straining, rubbing the eye, or getting water in the eye.  Please wear protective eyewear over the eye at all times including glasses during the day and the protective shield at bedtime.  Patient will return to clinic in 1-2 months with repeat evaluation, visual field test (OD:LVC and OS:24-2), dilated eye exam and disc photos.  .    Use the following drops (RIGHT EYE ONLY):  Antibiotic --  Ciprofloxacin (tan top): Discontinue    Steroid -- Pred Forte/Prednisolone Acetate: 2x/day for 2  weeks then 1x/day for 2 weeks then discontinue    Please be sure to call if you have any decrease in vision, increase in pain, flashing lights, redness, or a lot of drainage.      Resident Note:  IOP slightly elevated above goal right eye   Possible mild steroid response  Start pred forte taper right eye  Start Alphagan in the right eye  Continue present management left eye   Follow up in one months for IOP check    Norris Sandra MD  PGY-3 Ophthalmology Resident  [phone]    Attending Physician Attestation:  Complete documentation of historical and exam elements from today's encounter can be found in the full encounter summary report (not reduplicated in this progress note). I personally obtained the chief complaint(s) and history of present illness.  I confirmed and edited as necessary the review of systems, past medical/surgical history, family history, social history, and examination findings as documented by others; and I examined the patient myself. I personally reviewed the relevant tests, images, and reports as documented above. I formulated and edited as necessary the assessment and plan and discussed the findings and management plan with the patient and family.  - Glenys Green MD

## 2018-09-06 ENCOUNTER — OFFICE VISIT (OUTPATIENT)
Dept: OPHTHALMOLOGY | Facility: CLINIC | Age: 78
End: 2018-09-06
Attending: OPHTHALMOLOGY
Payer: MEDICARE

## 2018-09-06 DIAGNOSIS — Z96.1 PSEUDOPHAKIA OF BOTH EYES: ICD-10-CM

## 2018-09-06 DIAGNOSIS — H40.1133 PRIMARY OPEN ANGLE GLAUCOMA OF BOTH EYES, SEVERE STAGE: Primary | ICD-10-CM

## 2018-09-06 PROCEDURE — 92083 EXTENDED VISUAL FIELD XM: CPT | Mod: ZF | Performed by: OPHTHALMOLOGY

## 2018-09-06 PROCEDURE — G0463 HOSPITAL OUTPT CLINIC VISIT: HCPCS | Mod: ZF

## 2018-09-06 ASSESSMENT — EXTERNAL EXAM - LEFT EYE: OS_EXAM: NORMAL

## 2018-09-06 ASSESSMENT — CONF VISUAL FIELD
METHOD: COUNTING FINGERS
OD_SUPERIOR_TEMPORAL_RESTRICTION: 3
OD_INFERIOR_TEMPORAL_RESTRICTION: 3
OD_INFERIOR_NASAL_RESTRICTION: 1
OD_SUPERIOR_NASAL_RESTRICTION: 1

## 2018-09-06 ASSESSMENT — VISUAL ACUITY
METHOD: SNELLEN - LINEAR
OS_SC: 20/25
OD_SC: 20/25
OD_SC+: -1 SEARCHING

## 2018-09-06 ASSESSMENT — EXTERNAL EXAM - RIGHT EYE: OD_EXAM: NORMAL

## 2018-09-06 ASSESSMENT — TONOMETRY
IOP_METHOD: APPLANATION
OD_IOP_MMHG: 20
OS_IOP_MMHG: 18
OS_IOP_MMHG: 18
IOP_METHOD: APPLANATION
OD_IOP_MMHG: 20

## 2018-09-06 ASSESSMENT — SLIT LAMP EXAM - LIDS: COMMENTS: MGD, BLEPHARITIS

## 2018-09-06 ASSESSMENT — PACHYMETRY
OS_CT(UM): 533
OD_CT(UM): 532

## 2018-09-06 NOTE — PROGRESS NOTES
1)POAG -- s/p Tube OD (5/2/18), s/p Istent OS (11/27/17), s/p Bleb Revision OD (5/31/17), s/p Trab OD (4/24/17), s/p SLT OD (1/10 and 6/14) -- K pachy:531/562 Tmax: 29/28 HVF: OD:Central island (prog in 2018), Superior and Inferior arcuate defects (prog in 2017) and Superior arcuate stable from 7826-7722 with possible progression in 2014 and OD:Sup arcuate with IN dec sens in 2016 and OS:Fluct  CDR:0.9/0.5 HRT/OCT: OD:Mod RNFL thinning with ?prog in 2016 and OS:RNFL WNL FHX of Glc: No Gonio: open Intolerant to: Asthma/COPD: No, tolerating topical BB Steroid Use: Yes Kidney Stones: No Sulfa Allergy: No IOP targets: OD:M-Hteens and OS:Hteens-L20s  --   2)H/O Ch Nevus OS  3)PCIOL OU c PCO OS -- could consider yag cap OS in future   4)S/p evacuation of bilateral subdural hematoma 7/2/16 -- H/O CTs and MRI at [facility] in 2016  5)H/O Bleb Leak OD s/p Bleb revision -- resolved with BCTL -- remains joaquina negative  6)H/O Uveitis OD -- Quiescent  7)CME OD -- following with Dr. Schaeffer  8)JAIRO -- rec AT    MD:pt has been off Pred for last couple days    Patient will continue on Cosopt (Timolol/Dorzolamide) which is a blue top drop 2x/day (12 hours apart) in BOTH EYES, Latanoprost which is a teal top drop at bedtime in BOTH EYES and Alphagan (Brimonidine) which is a purple top drop 2x/day (12 hours apart) in the LEFT EYE ONLY.   Patient will continue to follow up with Dr. Schaeffer.   Patient will return to clinic in 3-4 months with repeat evaluation, visual field test (OD:LVC), dilated eye exam, disc photos and IOP check.      Resident Note:  Here for IOP check and visual field  visual field right eye ([facility]) with central island; left eye non specific changes -- stable  Start Latanoprost at bedtime in the right eye, continue in the left  Continue the rest of the current drops  Follow up in 2 months for IOP check    Norris Sandra MD  PGY-3 Ophthalmology Resident  [phone]    Attending Physician Attestation:  Complete  documentation of historical and exam elements from today's encounter can be found in the full encounter summary report (not reduplicated in this progress note). I personally obtained the chief complaint(s) and history of present illness.  I confirmed and edited as necessary the review of systems, past medical/surgical history, family history, social history, and examination findings as documented by others; and I examined the patient myself. I personally reviewed the relevant tests, images, and reports as documented above. I formulated and edited as necessary the assessment and plan and discussed the findings and management plan with the patient and family.  - Glenys Green MD

## 2018-09-06 NOTE — MR AVS SNAPSHOT
After Visit Summary   9/6/2018    Rusty Harris    MRN: [id number]           Patient Information     Date Of Birth          1940        Visit Information        Provider Department      9/6/2018 9:45 AM Glenys Green MD [facility]        Today's Diagnoses     Primary open angle glaucoma of both eyes, severe stage    -  1    Pseudophakia of both eyes          Care Instructions    Patient will continue on Cosopt (Timolol/Dorzolamide) which is a blue top drop 2x/day (12 hours apart) in BOTH EYES, Latanoprost which is a teal top drop at bedtime in BOTH EYES and Alphagan (Brimonidine) which is a purple top drop 2x/day (12 hours apart) in the LEFT EYE ONLY.   Patient will continue to follow up with Dr. Schaeffer.   Patient will return to clinic in 3-4 months with repeat evaluation, visual field test (OD:LVC), dilated eye exam, disc photos and IOP check.            Follow-ups after your visit        Follow-up notes from your care team     Return 3-4 months with repeat evaluation, visual field test (OD:LVC), dilated eye exam, disc photos and IOP.      Your next 10 appointments already scheduled     Jan 08, 2019 12:30 PM CST   RETURN GLAUCOMA with Glenys Green MD   Eye Clinic ([facility])    [address] [address]   [phone]              Future tests that were ordered for you today     Open Future Orders        Priority Expected Expires Ordered    DILATED FUNDUS EXAM Routine  9/6/2019 9/6/2018    Fundus Photos OU (both eyes) Routine  3/5/2020 9/6/2018            Who to contact     Please call your clinic at [phone] to:    Ask questions about your health    Make or cancel appointments    Discuss your medicines    Learn about your test results    Speak to your doctor            Additional Information About Your Visit        Care EveryWhere ID     This is your Care EveryWhere ID. This could be used by other  organizations to access your [place] medical records  [id number]         Blood Pressure from Last 3 Encounters:   05/02/18 113/63    Weight from Last 3 Encounters:   05/02/18 81.6 kg (180 lb)              We Performed the Following     Low Vision Central OD     OVF 24-2 Dynamic OS          Today's Medication Changes          These changes are accurate as of 9/6/18 12:51 PM.  If you have any questions, ask your nurse or doctor.               These medicines have changed or have updated prescriptions.        Dose/Directions    dorzolamide-timolol 2-0.5 % ophthalmic solution   Commonly known as:  COSOPT   This may have changed:  Another medication with the same name was removed. Continue taking this medication, and follow the directions you see here.   Used for:  Primary open angle glaucoma of both eyes, moderate stage   Changed by:  Glenys Green MD        Dose:  1 drop   Place 1 drop into both eyes every 12 hours   Quantity:  3 Bottle   Refills:  1         Stop taking these medicines if you haven't already. Please contact your care team if you have questions.     bromfenac 0.09 % ophthalmic solution   Commonly known as:  XIBROM   Stopped by:  Glenys Green MD           gatifloxacin 0.5 % ophthalmic solution   Commonly known as:  ZYMAXID   Stopped by:  Glenys Green MD           prednisoLONE acetate 1 % ophthalmic susp   Commonly known as:  PRED FORTE   Stopped by:  Glenys Green MD                    Primary Care Provider Office Phone # Fax #    Gustabo Melendez [phone]       [address] [address]        Equal Access to Services     [facility] AH: Hadii aad ku hadasho Soomaali, waaxda luqadaha, qaybta kaalmada adeegyada, waxay idiin haylilliann april mejía'luis browning. So [facility] [phone].    ATENCIÓN: Si habla español, tiene a jacob disposición servicios gratuitos de asistencia lingüística. Llame al [phone].    We comply with  applicable federal civil rights laws and [place] laws. We do not discriminate on the basis of race, color, national origin, age, disability, sex, sexual orientation, or gender identity.            Thank you!     Thank you for choosing EYE CLINIC  for your care. Our goal is always to provide you with excellent care. Hearing back from our patients is one way we can continue to improve our services. Please take a few minutes to complete the written survey that you may receive in the mail after your visit with us. Thank you!             Your Updated Medication List - Protect others around you: Learn how to safely use, store and throw away your medicines at www.disposemymeds.org.          This list is accurate as of 9/6/18 12:51 PM.  Always use your most recent med list.                   Brand Name Dispense Instructions for use Diagnosis    brimonidine 0.2 % ophthalmic solution    ALPHAGAN    15 mL    1 drop 2x/day (12 hours apart) in BOTH EYES.    Primary open angle glaucoma of both eyes, moderate stage       dorzolamide-timolol 2-0.5 % ophthalmic solution    COSOPT    3 Bottle    Place 1 drop into both eyes every 12 hours    Primary open angle glaucoma of both eyes, moderate stage       latanoprost 0.005 % ophthalmic solution    XALATAN    7.5 mL    Place 1 drop into both eyes At Bedtime    Primary open angle glaucoma of both eyes, moderate stage       SIMVASTATIN PO      Take 20 mg by mouth At Bedtime

## 2018-09-06 NOTE — NURSING NOTE
Chief Complaints and History of Present Illnesses   Patient presents with     Primary Open Angle Glaucoma Follow Up     5 week return     HPI    Affected eye(s):  Both   Symptoms:     Blurred vision (Comment: RE)   No decreased vision   No floaters   No flashes   No itching   No burning      Duration:  5 weeks   Frequency:  Constant       Do you have eye pain now?:  No      Comments:  Cosopt  2x/day BOTH EYES / LD @ 7 am today  Latanoprost bedtime in the LEFT EYE ONLY /  LD @ 10 pm last ezequiel  Alphagan 2x/day  LEFT EYE ONLY. /  LD @ 8 am today    Alice YIN 10:40 AM 09/06/2018

## 2018-09-06 NOTE — PATIENT INSTRUCTIONS
Patient will continue on Cosopt (Timolol/Dorzolamide) which is a blue top drop 2x/day (12 hours apart) in BOTH EYES, Latanoprost which is a teal top drop at bedtime in BOTH EYES and Alphagan (Brimonidine) which is a purple top drop 2x/day (12 hours apart) in the LEFT EYE ONLY.   Patient will continue to follow up with Dr. Schaeffer.   Patient will return to clinic in 3-4 months with repeat evaluation, visual field test (OD:LVC), dilated eye exam, disc photos and IOP check.

## 2018-10-02 DIAGNOSIS — H40.1132 PRIMARY OPEN ANGLE GLAUCOMA OF BOTH EYES, MODERATE STAGE: ICD-10-CM

## 2018-10-02 RX ORDER — DORZOLAMIDE HYDROCHLORIDE AND TIMOLOL MALEATE 20; 5 MG/ML; MG/ML
SOLUTION/ DROPS OPHTHALMIC
Qty: 20 ML | Refills: 2 | Status: SHIPPED | OUTPATIENT
Start: 2018-10-02 | End: 2019-11-04

## 2018-10-02 NOTE — TELEPHONE ENCOUNTER
Medication: cosopt      Last Written Prescription Date:  8/4/17  Last Fill Quantity: 3  # refills: 1    Last Office Visit: 9/6/18  Future Office visit: 1/8/18  Patient will continue on Cosopt (Timolol/Dorzolamide) which is a blue top drop 2x/day (12 hours apart) in BOTH EYES,

## 2019-01-08 ENCOUNTER — OFFICE VISIT (OUTPATIENT)
Dept: OPHTHALMOLOGY | Facility: CLINIC | Age: 79
End: 2019-01-08
Attending: OPHTHALMOLOGY
Payer: COMMERCIAL

## 2019-01-08 DIAGNOSIS — H40.1113 PRIMARY OPEN ANGLE GLAUCOMA (POAG) OF RIGHT EYE, SEVERE STAGE: Primary | ICD-10-CM

## 2019-01-08 PROCEDURE — 92083 EXTENDED VISUAL FIELD XM: CPT | Mod: ZF | Performed by: OPHTHALMOLOGY

## 2019-01-08 PROCEDURE — G0463 HOSPITAL OUTPT CLINIC VISIT: HCPCS | Mod: ZF

## 2019-01-08 ASSESSMENT — TONOMETRY
OD_IOP_MMHG: 13
OS_IOP_MMHG: 13
OS_IOP_MMHG: 13
OS_IOP_MMHG: 19
IOP_METHOD: APPLANATION
OD_IOP_MMHG: 15
IOP_METHOD: APPLANATION
IOP_METHOD: APPLANATION
OD_IOP_MMHG: 14

## 2019-01-08 ASSESSMENT — CONF VISUAL FIELD
OD_INFERIOR_NASAL_RESTRICTION: 3
OD_INFERIOR_TEMPORAL_RESTRICTION: 3
OS_NORMAL: 1
METHOD: COUNTING FINGERS
OD_SUPERIOR_TEMPORAL_RESTRICTION: 1
OD_SUPERIOR_NASAL_RESTRICTION: 3

## 2019-01-08 ASSESSMENT — EXTERNAL EXAM - LEFT EYE: OS_EXAM: NORMAL

## 2019-01-08 ASSESSMENT — VISUAL ACUITY
METHOD: SNELLEN - LINEAR
OS_SC: 20/25
OD_SC+: +2
OD_SC: 20/30 SLOW

## 2019-01-08 ASSESSMENT — CUP TO DISC RATIO
OD_RATIO: 0.9
OS_RATIO: 0.7

## 2019-01-08 ASSESSMENT — EXTERNAL EXAM - RIGHT EYE: OD_EXAM: NORMAL

## 2019-01-08 NOTE — PROGRESS NOTES
1)POAG -- s/p Tube OD (5/2/18), s/p Istent OS (11/27/17), s/p Bleb Revision OD (5/31/17), s/p Trab OD (4/24/17), s/p SLT OD (1/10 and 6/14) -- K pachy:531/562 Tmax: 29/28 HVF: OD:Central island (prog in 2018), Superior and Inferior arcuate defects (prog in 2017) and Superior arcuate stable from 2764-5480 with possible progression in 2014 and OD:Sup arcuate with IN dec sens in 2016 and OS:Fluct  CDR:0.9/0.5 HRT/OCT: OD:Mod RNFL thinning with ?prog in 2016 and OS:RNFL WNL FHX of Glc: No Gonio: open Intolerant to: Asthma/COPD: No, tolerating topical BB Steroid Use: Yes Kidney Stones: No Sulfa Allergy: No IOP targets: OD:M-Hteens and OS:Hteens-L20s  --   2)H/O Ch Nevus OS  3)PCIOL OU c PCO OS -- could consider yag cap OS in future   4)S/p evacuation of bilateral subdural hematoma 7/2/16 -- H/O CTs and MRI at [facility] in 2016  5)H/O Bleb Leak OD s/p Bleb revision -- resolved with BCTL -- remains joaquina negative  6)H/O Uveitis OD -- Quiescent  7)CME OD -- following with Dr. Schaeffer  8)JAIRO -- rec AT    Patient will continue on Cosopt (Timolol/Dorzolamide) which is a blue top drop 2x/day (12 hours apart) in BOTH EYES, Latanoprost which is a teal top drop at bedtime in BOTH EYES and Alphagan (Brimonidine) which is a purple top drop 2x/day (12 hours apart) in the LEFT EYE ONLY.   Patient will continue to follow up with Dr. Schaeffer.   Patient will return to clinic in 3-4 months with repeat evaluation, visual field test (OD:LVC), OCT with RNFL anaysis and IOP check.      Resident Note:   Complains of new left medial canthus lesion over the past week.  Currently using:  Cosopt twice a day both eyes  Latanoprost at bedtime both eyes  Alphagan twice a day left eye     visual field right eye (LVC) 01/08/19   right eye with central island stable compared to baseline 9/2018    Plan:  Continue Cosopt, Latanoprost both eyes   Continue Alphagan left eye     Return to clinic 4 month for oct retinal nerve fiber layer, intraocular pressure check      Kerwin Velazquez MD  PGY4    Attending Physician Attestation:  Complete documentation of historical and exam elements from today's encounter can be found in the full encounter summary report (not reduplicated in this progress note). I personally obtained the chief complaint(s) and history of present illness.  I confirmed and edited as necessary the review of systems, past medical/surgical history, family history, social history, and examination findings as documented by others; and I examined the patient myself. I personally reviewed the relevant tests, images, and reports as documented above. I formulated and edited as necessary the assessment and plan and discussed the findings and management plan with the patient and family.  - Glenys Green MD

## 2019-01-08 NOTE — PATIENT INSTRUCTIONS
Patient will continue on Cosopt (Timolol/Dorzolamide) which is a blue top drop 2x/day (12 hours apart) in BOTH EYES, Latanoprost which is a teal top drop at bedtime in BOTH EYES and Alphagan (Brimonidine) which is a purple top drop 2x/day (12 hours apart) in the LEFT EYE ONLY.   Patient will continue to follow up with Dr. Schaeffer.   Patient will return to clinic in 3-4 months with repeat evaluation, visual field test (OD:LVC), OCT with RNFL anaysis and IOP check.

## 2019-04-13 DIAGNOSIS — H40.1132 PRIMARY OPEN ANGLE GLAUCOMA OF BOTH EYES, MODERATE STAGE: ICD-10-CM

## 2019-04-15 RX ORDER — LATANOPROST 50 UG/ML
1 SOLUTION/ DROPS OPHTHALMIC AT BEDTIME
Qty: 7.5 ML | Refills: 1 | Status: SHIPPED | OUTPATIENT
Start: 2019-04-15 | End: 2019-12-26

## 2019-04-15 NOTE — TELEPHONE ENCOUNTER
Last Clinic Visit: 1-8-19 Eye Clinic  Last Clinic Note: Latanoprost which is a teal top drop at bedtime in BOTH EYES   Pharmacy request 90 day supply

## 2019-08-01 NOTE — PATIENT INSTRUCTIONS
Patient will continue on Cosopt (Timolol/Dorzolamide) which is a blue top drop 2x/day (12 hours apart) in both eyes, Latanoprost which is a teal top drop at bedtime in both eyes, and Alphagan (Brimonidine) which is a purple top drop 3x/day (8 hours apart) in the right eye. A long discussion of the risks, benefits, and alternatives including potential treatment and management options were had with patient and a decision was made to proceed with combined cataract and glaucoma surgery (Trabeculectomy) in the right eye.    Patient will also begin Diamox 500mg Sequels 2x/day (12 hours apart) as a temporizing measure prior to surgery.  Patient will talk to his primary care doctor prior to starting the medication.    
independent

## 2019-09-30 DIAGNOSIS — H40.1132 PRIMARY OPEN ANGLE GLAUCOMA OF BOTH EYES, MODERATE STAGE: ICD-10-CM

## 2019-10-01 RX ORDER — BRIMONIDINE TARTRATE 2 MG/ML
1 SOLUTION/ DROPS OPHTHALMIC 2 TIMES DAILY
Qty: 15 ML | Refills: 0 | Status: SHIPPED | OUTPATIENT
Start: 2019-10-01 | End: 2020-02-11

## 2019-10-01 NOTE — TELEPHONE ENCOUNTER
Medication: brimonidine (ALPHAGAN) 0.2 % ophthalmic solution    Requested directions: PLACE 1 DROP IN EACH EYE TWO TIMES A DAY (EVERY 12 HOURS)  Current directions on the medication list: Same    Last Written Prescription Date:  3-19-18  Last Fill Quantity: 15 ml,   # refills: 3    Last Office Visit: 1-8-19  Future Office visit: none    Attending Provider: Norma  Last Clinic Note: Alphagan (Brimonidine) which is a purple top drop 2x/day (12 hours apart) in the LEFT EYE ONLY.      Routing refill request to provider for review/approval because:  Inconsistent dose/directions  Requested/current med directions do not match clinic note    rx pended as clinic note

## 2019-11-04 DIAGNOSIS — H40.1132 PRIMARY OPEN ANGLE GLAUCOMA OF BOTH EYES, MODERATE STAGE: ICD-10-CM

## 2019-11-04 RX ORDER — DORZOLAMIDE HYDROCHLORIDE AND TIMOLOL MALEATE 20; 5 MG/ML; MG/ML
1 SOLUTION/ DROPS OPHTHALMIC 2 TIMES DAILY
Qty: 20 ML | Refills: 0 | Status: SHIPPED | OUTPATIENT
Start: 2019-11-04

## 2019-11-04 NOTE — TELEPHONE ENCOUNTER
Last Clinic Visit: 1-8-19 Eye Clinic  Last clinic note: dorzolamide-timolol (COSOPT) 2-0.5 % ophthalmic solution

## 2019-12-26 DIAGNOSIS — H40.1132 PRIMARY OPEN ANGLE GLAUCOMA OF BOTH EYES, MODERATE STAGE: ICD-10-CM

## 2019-12-26 RX ORDER — LATANOPROST 50 UG/ML
1 SOLUTION/ DROPS OPHTHALMIC AT BEDTIME
Qty: 7.5 ML | Refills: 0 | Status: SHIPPED | OUTPATIENT
Start: 2019-12-26 | End: 2020-04-28

## 2019-12-26 NOTE — TELEPHONE ENCOUNTER
Last Clinic Visit: 1/8/19, no upcoming visits scheduled, has cancelled eye clinic visits since 1/8/19  Last clinic note: Latanoprost which is a teal top drop at bedtime in BOTH EYES  Return to clinic 4 month

## 2020-02-07 DIAGNOSIS — H40.1132 PRIMARY OPEN ANGLE GLAUCOMA OF BOTH EYES, MODERATE STAGE: ICD-10-CM

## 2020-02-07 RX ORDER — BRIMONIDINE TARTRATE 2 MG/ML
SOLUTION/ DROPS OPHTHALMIC
Qty: 15 ML | Refills: 0 | OUTPATIENT
Start: 2020-02-07

## 2020-02-07 NOTE — TELEPHONE ENCOUNTER
BRIMONIDINE TARTRATE 0.2% SOLN  Dx:  Primary open angle glaucoma of both eyes, moderate stage    Requested directions: Place 1 drop Into the left eye 2 times daily 12 hours apart  Current directions on the medication list: Place 1 drop Into the left eye 2 times daily 12 hours apart    Last Written Prescription Date:  10/1/2019  Last Fill Quantity: 15 mL,   # refills: 0    Last Office Visit: 1/8/2019  Future Office visit: None     Attending Provider: Glenys Green MD   Ophthalmology  Last Clinic Note:  1/8/2019  Plan:  Continue Cosopt, Latanoprost both eyes   Continue Alphagan left eye      Return to clinic 4 month for oct retinal nerve fiber layer, intraocular pressure check     Routing refill request to provider for review/approval because:  Second request for a refill of a med.   No office visit scheduled   Med refill refuse

## 2020-02-11 DIAGNOSIS — H40.1132 PRIMARY OPEN ANGLE GLAUCOMA OF BOTH EYES, MODERATE STAGE: ICD-10-CM

## 2020-02-11 RX ORDER — BRIMONIDINE TARTRATE 2 MG/ML
1 SOLUTION/ DROPS OPHTHALMIC 2 TIMES DAILY
Qty: 15 ML | Refills: 3 | Status: SHIPPED | OUTPATIENT
Start: 2020-02-11

## 2020-02-11 NOTE — TELEPHONE ENCOUNTER
BRIMONIDINE TARTRATE 0.2% SOLN  Dx:  Primary open angle glaucoma of both eyes, moderate stage       Requested directions:  Place 1 drop Into the left eye 2 times daily 12 hours apart. For refills, please schedule a follow-up appointment at [phone]. - Left Eye  Current directions on the medication list:  Place 1 drop Into the left eye 2 times daily 12 hours apart. For refills, please schedule a follow-up appointment at [phone]. - Left Eye    Last Written Prescription Date:  10/1/2019  Last Fill Quantity: 15 mL,   # refills: 0    Last Office Visit:  1/8/2019  Future Office visit: None    Attending Provider:  Glenys Green MD  Ophthalmology   Last Clinic Note: 1/8/2019    Patient will continue on Cosopt (Timolol/Dorzolamide) which is a blue top drop 2x/day (12 hours apart) in BOTH EYES, Latanoprost which is a teal top drop at bedtime in BOTH EYES and Alphagan (Brimonidine) which is a purple top drop 2x/day (12 hours apart) in the LEFT EYE ONLY.   Patient will continue to follow up with Dr. Schaeffer.   Patient will return to clinic in 3-4 months with repeat evaluation, visual field test (OD:LVC), OCT with RNFL anaysis and IOP check.    15 mL's, 3 Refills sent to pharmacy for Pt care on  2/11/2020.    Jamilah Baltazar RN  Central Triage Red Flags/Med Refills

## 2020-02-11 NOTE — TELEPHONE ENCOUNTER
Pt has new eye provider closer to home and has seen in past year    Asked pt to contact his  pharmacy and update them on new provider for refill requests    Pt verbally demonstrated understanding  Miguel Choudhary RN 1:39 PM 02/11/20

## 2020-04-28 DIAGNOSIS — H40.1132 PRIMARY OPEN ANGLE GLAUCOMA OF BOTH EYES, MODERATE STAGE: ICD-10-CM

## 2020-04-28 RX ORDER — LATANOPROST 50 UG/ML
1 SOLUTION/ DROPS OPHTHALMIC AT BEDTIME
Qty: 7.5 ML | Refills: 1 | Status: SHIPPED | OUTPATIENT
Start: 2020-04-28

## 2020-04-28 RX ORDER — LATANOPROST 50 UG/ML
1 SOLUTION/ DROPS OPHTHALMIC AT BEDTIME
Qty: 7.5 ML | Refills: 0 | OUTPATIENT
Start: 2020-04-28

## 2020-04-28 NOTE — TELEPHONE ENCOUNTER
latanoprost (XALATAN) 0.005 % ophthalmic solution   Diagnosis : Primary open angle glaucoma of both eyes, moderate stage    Requested directions: Same  Current directions on the medication list: Place 1 drop into both eyes At Bedtime     Last Written Prescription Date:  12/26/19  Last Fill Quantity: 7.5 ml,   # refills: 0    Last Office Visit: 1/18/19  Future Office visit: none  Attending Provider:Glenys Green MD   Ophthalmology    Last Clinic Note: 1/8/19    Denied. Appointment needed.     Scheduling has been notified to contact the pt for appointment.

## 2021-03-23 DIAGNOSIS — H40.1132 PRIMARY OPEN ANGLE GLAUCOMA OF BOTH EYES, MODERATE STAGE: ICD-10-CM

## 2021-03-23 RX ORDER — LATANOPROST 50 UG/ML
1 SOLUTION/ DROPS OPHTHALMIC AT BEDTIME
OUTPATIENT
Start: 2021-03-23

## 2021-03-23 NOTE — TELEPHONE ENCOUNTER
latanoprost (XALATAN) 0.005 % ophthalmic solution    Requested directions:  Place 1 drop into both eyes At Bedtime For additional refills, please schedule a follow-up appointment with Dr Green at [phone]. - Both Eyes  Current directions on the medication list:  Place 1 drop into both eyes At Bedtime For additional refills, please schedule a follow-up appointment with Dr Green at [phone]. - Both Eyes    Last Written Prescription Date:  4/28/2020  Last Fill Quantity: 7.5,   # refills: 1    Last Office Visit: 1/18/2019  Future Office visit:  None    Attending Provider:  Dr. Green  Last Clinic Note: 1/18/2019    Plan:  Continue Cosopt, Latanoprost both eyes   Continue Alphagan left eye      Return to clinic 4 month for oct retinal nerve fiber layer, intraocular pressure check      Kerwin Velazquez MD  PGY4    Routing refill request to provider for review/approval because:  Dr. Green gone?? Pt need to establish care with new Provider and Jan 2019 Last office visit     Jamilah Baltazar RN  Central Triage Red Flags/Med Refills

## 2021-03-23 NOTE — TELEPHONE ENCOUNTER
I spoke to the patient who notes he has established care with a different provider.  The patient will call the new provider for a refill

## 2021-10-11 NOTE — MR AVS SNAPSHOT
After Visit Summary   5/3/2018    Rusty Harris    MRN: [id number]           Patient Information     Date Of Birth          1940        Visit Information        Provider Department      5/3/2018 10:30 AM Glenys Green MD [facility]        Today's Diagnoses     Primary open angle glaucoma of both eyes, severe stage    -  1      Care Instructions    Patient will continue on Cosopt (Timolol/Dorzolamide) which is a blue top drop 2x/day (12 hours apart) in the LEFT EYE ONLY, Latanoprost which is a teal top drop at bedtime in the LEFT EYE ONLY and Alphagan (Brimonidine) which is a purple top drop 2x/day (12 hours apart) in the LEFT EYE ONLY.   Patient will also discontinue and hold onto the Diamox 500mg Sequels.  Patient will continue to follow up with Dr. Schaeffer.   No heavy lifting, bending, stooping, straining, rubbing the eye, or getting water in the eye.  Please wear protective eyewear over the eye at all times including glasses during the day and the protective shield at bedtime.  Patient will return to clinic in 1 week with repeat evaluation.    Use the following drops (RIGHT EYE ONLY):  Antibiotic --  Ciprofloxacin (tan top): 4x/day until finished (use for at least 5-7 days after surgery)    Steroid -- Pred Forte/Prednisolone Acetate: every 2 hours while awake    Please be sure to call if you have any decrease in vision, increase in pain, flashing lights, redness, or a lot of drainage.            Follow-ups after your visit        Your next 10 appointments already scheduled     May 10, 2018 10:30 AM CDT   Post-Op with Glenys Green MD   Eye Clinic ([facility])    [place] [address] [facility] [address] [place] [address]   [phone]              Who to contact     Please call your clinic at [phone] to:    Ask questions about your health    Make or cancel appointments    Discuss your medicines    Learn about your test results    Speak to  your doctor            Additional Information About Your Visit        [employer] Information     [employer] is an electronic gateway that provides easy, online access to your medical records. With [employer], you can request a clinic appointment, read your test results, renew a prescription or communicate with your care team.     To sign up for [employer] visit the website at [email or web address]   You will be asked to enter the access code listed below, as well as some personal information. Please follow the directions to create your username and password.     Your access code is: [email or web address]  Expires: 2018  7:30 AM     Your access code will  in 90 days. If you need help or a new code, please contact your [facility] or call [phone] for assistance.        Care EveryWhere ID     This is your Care EveryWhere ID. This could be used by other organizations to access your [place] medical records  [id number]         Blood Pressure from Last 3 Encounters:   18 113/63    Weight from Last 3 Encounters:   18 81.6 kg (180 lb)              Today, you had the following     No orders found for display         Today's Medication Changes          These changes are accurate as of 5/3/18 11:31 AM.  If you have any questions, ask your nurse or doctor.               Start taking these medicines.        Dose/Directions    ciprofloxacin 0.3 % ophthalmic solution   Commonly known as:  CILOXAN   Used for:  Primary open angle glaucoma of both eyes, severe stage   Started by:  Glenys Green MD        Dose:  1 drop   Place 1 drop into the right eye 4 times daily   Quantity:  1 Bottle   Refills:  0       prednisoLONE acetate 1 % ophthalmic susp   Commonly known as:  PRED FORTE   Used for:  Primary open angle glaucoma of both eyes, severe stage   Started by:  Glenys Green MD        Dose:  1 drop   Place 1 drop into the right eye every 2 hours   Quantity:  15 mL   Refills:   3            Where to get your medicines      These medications were sent to [facility] [place], [place] - [address] [facility] [address]  [address], [facility] [address]     Phone:  [phone]     ciprofloxacin 0.3 % ophthalmic solution    prednisoLONE acetate 1 % ophthalmic susp                Primary Care Provider Office Phone # Fax #    Gustabo Melendez [phone]       [facility] [address] [address]        Equal Access to Services     HELENE IBRAHIM AH: Hadii aad ku hadasho Soomaali, waaxda luqadaha, qaybta kaalmada adeegyada, waxay idiin hayaan april browning. So [facility] [phone].    ATENCIÓN: Si habla español, tiene a jacob disposición servicios gratuitos de asistencia lingüística. [facility] [phone].    We comply with applicable federal civil rights laws and [place] laws. We do not discriminate on the basis of race, color, national origin, age, disability, sex, sexual orientation, or gender identity.            Thank you!     Thank you for choosing EYE CLINIC  for your care. Our goal is always to provide you with excellent care. Hearing back from our patients is one way we can continue to improve our services. Please take a few minutes to complete the written survey that you may receive in the mail after your visit with us. Thank you!             Your Updated Medication List - Protect others around you: Learn how to safely use, store and throw away your medicines at www.disposemymeds.org.          This list is accurate as of 5/3/18 11:31 AM.  Always use your most recent med list.                   Brand Name Dispense Instructions for use Diagnosis    brimonidine 0.2 % ophthalmic solution    ALPHAGAN    15 mL    1 drop 2x/day (12 hours apart) in BOTH EYES.    Primary open angle glaucoma of both eyes, moderate stage       bromfenac 0.09 % ophthalmic solution    XIBROM    5 mL    Place 1 drop into both eyes 2 times daily     Primary open angle glaucoma of both eyes, severe stage       ciprofloxacin 0.3 % ophthalmic solution    CILOXAN    1 Bottle    Place 1 drop into the right eye 4 times daily    Primary open angle glaucoma of both eyes, severe stage       * dorzolamide-timolol 2-0.5 % ophthalmic solution    COSOPT    3 Bottle    Place 1 drop into both eyes every 12 hours    Primary open angle glaucoma of both eyes, moderate stage       * dorzolamide-timolol PF 22.3-6.8 MG/ML opthalmic solution    COSOPT PF    60 each    Place 1 drop into both eyes 2 times daily 12 hrs apart    Primary open angle glaucoma of both eyes, moderate stage       gatifloxacin 0.5 % ophthalmic solution    ZYMAXID     1 drop        latanoprost 0.005 % ophthalmic solution    XALATAN    7.5 mL    Place 1 drop into both eyes At Bedtime    Primary open angle glaucoma of both eyes, moderate stage       prednisoLONE acetate 1 % ophthalmic susp    PRED FORTE    15 mL    Place 1 drop into the right eye every 2 hours    Primary open angle glaucoma of both eyes, severe stage       SIMVASTATIN PO      Take 20 mg by mouth At Bedtime        * Notice:  This list has 2 medication(s) that are the same as other medications prescribed for you. Read the directions carefully, and ask your doctor or other care provider to review them with you.       Patient with one or more new problems requiring additional work-up/treatment.

## 2024-01-06 NOTE — MR AVS SNAPSHOT
After Visit Summary   12/5/2017    Rusty Harris    MRN: [id number]           Patient Information     Date Of Birth          1940        Visit Information        Provider Department      12/5/2017 12:45 PM Glenys Green MD [facility]        Today's Diagnoses     Pseudophakia of both eyes    -  1      Care Instructions    Patient will continue on Cosopt (Timolol/Dorzolamide) which is a blue top drop 2x/day (12 hours apart) in BOTH EYES, Latanoprost which is a teal top drop at bedtime in BOTH EYES, and Alphagan (Brimonidine) which is a purple top drop 2x/day (12 hours apart) in the RIGHT EYE ONLY.       No heavy lifting, bending, stooping, straining, rubbing the eye, or getting water in the eye.  Please wear protective eyewear over the eye at all times including glasses during the day and the protective shield at bedtime.  Patient will return to clinic in 1 month with repeat check.  Patient will also follow up with the retina clinic.      Use the following drops (LEFT EYE ONLY):  Antibiotic --  Gatifoxacin (tan top): Discontinue    NSAID -- Bromfenac 1x/day: for approximately 3 more weeks after surgery (or until gone)    Steroid -- Pred Forte/Prednisolone Acetate:  3x/day for 1 week then, 2x/day for 1 week then, 1x/day for 1 week then, stop.      Please be sure to call if you have any decrease in vision, increase in pain, flashing lights, redness, or a lot of drainage.              Follow-ups after your visit        Follow-up notes from your care team     Return 1 month with repeat check..      Your next 10 appointments already scheduled     Jan 05, 2018 10:15 AM CST   Post-Op with Glenys Geren MD   Eye Clinic ([facility])    [address]  [address]  [address]  [facility] [id number] [phone]              Who to contact     Please call your clinic at [phone] to:    Ask questions about your health    Make or cancel appointments    Discuss  your medicines    Learn about your test results    Speak to your doctor   If you have compliments or concerns about an experience at your clinic, or if you wish to file a complaint, please contact [facility] Patient Relations at [phone] or email us at [email or web address]         Additional Information About Your Visit        Thubrikar Aortic ValveharCitizengine Information     Red Seraphimt is an electronic gateway that provides easy, online access to your medical records. With RIDERS, you can request a clinic appointment, read your test results, renew a prescription or communicate with your care team.     To sign up for RIDERS visit the website at [email or web address]   You will be asked to enter the access code listed below, as well as some personal information. Please follow the directions to create your username and password.     Your access code is: [email or web address]  Expires: 2018  6:31 AM     Your access code will  in 90 days. If you need help or a new code, please contact your [facility] or call [phone] for assistance.        Care EveryWhere ID     This is your Care EveryWhere ID. This could be used by other organizations to access your [place] medical records  [id number]         Blood Pressure from Last 3 Encounters:   No data found for BP    Weight from Last 3 Encounters:   No data found for Wt              Today, you had the following     No orders found for display       Primary Care Provider Office Phone # Fax #    Gustabo Melendez [phone]       [address] [address]        Equal Access to Services     HELENE IBRAHIM : Hadii tamera fernando Sohiwot, waaxda luqadaha, qaybta kaalmada adegretchen, jackie browning. So [facility] [phone].    ATENCIÓN: Si habla español, tiene a jacob disposición servicios gratuitos de asistencia lingüística. Llame al  [phone].    We comply with applicable federal civil rights laws and [place] laws. We do not discriminate on the basis of race, color, national origin, age, disability, sex, sexual orientation, or gender identity.            Thank you!     Thank you for choosing EYE CLINIC  for your care. Our goal is always to provide you with excellent care. Hearing back from our patients is one way we can continue to improve our services. Please take a few minutes to complete the written survey that you may receive in the mail after your visit with us. Thank you!             Your Updated Medication List - Protect others around you: Learn how to safely use, store and throw away your medicines at www.disposemymeds.org.          This list is accurate as of: 12/5/17  2:36 PM.  Always use your most recent med list.                   Brand Name Dispense Instructions for use Diagnosis    acetaZOLAMIDE 500 MG 12 hr capsule    DIAMOX SEQUEL    60 capsule    Take 1 capsule (500 mg) by mouth 2 times daily    Primary open angle glaucoma of both eyes, severe stage       brimonidine 0.2 % ophthalmic solution    ALPHAGAN    15 mL    Place 1 drop into the right eye 3 times daily    Primary open angle glaucoma of both eyes, moderate stage       Bromfenac Sodium 0.09 % Soln    BROMDAY     1 drop        dorzolamide-timolol 2-0.5 % ophthalmic solution    COSOPT    3 Bottle    Place 1 drop into both eyes every 12 hours    Primary open angle glaucoma of both eyes, moderate stage       gatifloxacin 0.5 % ophthalmic solution    ZYMAXID     1 drop        latanoprost 0.005 % ophthalmic solution    XALATAN    2.5 mL    Place 1 drop into both eyes At Bedtime    Primary open angle glaucoma of both eyes, moderate stage       prednisoLONE acetate 1 % ophthalmic susp    PRED FORTE     1 drop        SIMVASTATIN PO              show

## 2025-02-20 NOTE — MR AVS SNAPSHOT
After Visit Summary   6/13/2017    Rusty Harris    MRN: [id number]           Patient Information     Date Of Birth          1940        Visit Information        Provider Department      6/13/2017 7:30 AM Red Sagastume MD [facility]        Today's Diagnoses     Foreign body in cornea, right, sequela    -  1    Leaking of conjunctival drainage bleb        Postoperative eye state          Care Instructions    A bandage contact lens has been replaced in your right eye  While the lens in place, continue using the antibiotic ciprofloxacin (Tan Cap) 4 times a day in you right eye  Begin using your dorzolamide-timolol/Cosopt (Blue Cap) 1 drop 1 time daily in the morning right eye    Continue your glaucoma drops in the left eye. Latanoprost/Xalatan (Turqoise Cap) 1 drop 1 time daily and dorzolamide-timolol/Cosopt (Blue Cap) 1 drop 2 times daily.     Return in 2-3 weeks with Dr. Green.    If you have any increasing pain, redness, or decreasing vision; call the eye clinic immediately [phone]. If after hours, call [phone] and ask for the ophthalmology resident on call.          Follow-ups after your visit        Follow-up notes from your care team     Return in about 2 weeks (around 6/27/2017) for with Dr. Green.      Your next 10 appointments already scheduled     Jun 20, 2017  7:30 AM CDT   Post-Op with Glenys Green MD   Eye Clinic ([facility])    [address]  [address]  [address]  [facility] [address] [phone]              Who to contact     Please call your clinic at [phone] to:    Ask questions about your health    Make or cancel appointments    Discuss your medicines    Learn about your test results    Speak to your doctor   If you have compliments or concerns about an experience at your clinic, or if you wish to file a complaint, please contact [facility] Patient Relations at [phone] or email  us at [email or web address]         Additional Information About Your Visit        Weavehart Information     ITYZ is an electronic gateway that provides easy, online access to your medical records. With ITYZ, you can request a clinic appointment, read your test results, renew a prescription or communicate with your care team.     To sign up for ITYZ visit the website at [email or web address]   You will be asked to enter the access code listed below, as well as some personal information. Please follow the directions to create your username and password.     Your access code is: [email or web address]  Expires: 2017  2:53 PM     Your access code will  in 90 days. If you need help or a new code, please contact your [facility] or call [phone] for assistance.        Care EveryWhere ID     This is your Care EveryWhere ID. This could be used by other organizations to access your [place] medical records  [id number]         Blood Pressure from Last 3 Encounters:   No data found for BP    Weight from Last 3 Encounters:   No data found for Wt              Today, you had the following     No orders found for display       Primary Care Provider Office Phone # Fax #    Gustabo Melendez [phone]       [address] [address]        Thank you!     Thank you for choosing EYE CLINIC  for your care. Our goal is always to provide you with excellent care. Hearing back from our patients is one way we can continue to improve our services. Please take a few minutes to complete the written survey that you may receive in the mail after your visit with us. Thank you!             Your Updated Medication List - Protect others around you: Learn how to safely use, store and throw away your medicines at www.disposemymeds.org.          This list is accurate as of: 17  8:53 AM.  Always use your most recent med list.                    Brand Name Dispense Instructions for use    acetaZOLAMIDE 500 MG 12 hr capsule    DIAMOX SEQUEL    60 capsule    Take 1 capsule (500 mg) by mouth 2 times daily       brimonidine 0.2 % ophthalmic solution    ALPHAGAN    15 mL    Place 1 drop into the right eye 3 times daily       * ciprofloxacin 0.3 % ophthalmic solution    CILOXAN    1 Bottle    Place 1 drop into the right eye 4 times daily       * ciprofloxacin 0.3 % ophthalmic solution    CILOXAN    1 Bottle    Place 1 drop into the right eye 4 times daily       * dorzolamide-timolol 2-0.5 % ophthalmic solution    COSOPT     Place 1 drop Into the left eye 2 times daily       * dorzolamide-timolol 2-0.5 % ophthalmic solution    COSOPT    10 mL    Place 1 drop into both eyes 2 times daily *12 HOURS APART       ketorolac 0.5 % ophthalmic solution    ACULAR    1 Bottle    Place 1 drop into the right eye 3 times daily       * latanoprost 0.005 % ophthalmic solution    XALATAN     Place 1 drop Into the left eye At Bedtime       * latanoprost 0.005 % ophthalmic solution    XALATAN    2.5 mL    Place 1 drop into both eyes At Bedtime       prednisoLONE acetate 1 % ophthalmic susp    PRED FORTE     Place 1 drop Into the left eye 4 times daily       prednisoLONE acetate 1 % ophthalmic susp    PRED FORTE    1 Bottle    Place 1 drop into the right eye every 2 hours       SIMVASTATIN PO          * Notice:  This list has 6 medication(s) that are the same as other medications prescribed for you. Read the directions carefully, and ask your doctor or other care provider to review them with you.       Expected Date Of Service: 02/20/2025 Performing Laboratory: 0 Billing Type: Third-Party Bill Bill For Surgical Tray: no

## (undated) DEVICE — EYE PREP BETADINE 5% SOLUTION 30ML 0065-0411-30

## (undated) DEVICE — NDL 23GA 1" 305145

## (undated) DEVICE — GLOVE PROTEXIS MICRO 7.5  2D73PM75

## (undated) DEVICE — SU VICRYL 7-0 TG140-8DA 18" J546G

## (undated) DEVICE — EYE KNIFE STILETTO VISITEC 1.1MM ANG 45DEG SIDEPORT 376620

## (undated) DEVICE — EYE NDL RETROBULBAR ATKINSON 25GA 1.5" 581637

## (undated) DEVICE — EYE NDL RETROBULBAR 25GA 60-111637

## (undated) DEVICE — SU ETHILON 8-0 TG175-8 12" 1716G

## (undated) DEVICE — LINEN TOWEL PACK X5 5464

## (undated) DEVICE — EYE TIP BIPOLAR 18GA ERASER 221250

## (undated) DEVICE — ESU CORD BIPOLAR GREEN 10-4000

## (undated) DEVICE — PACK CATARACT CUSTOM ASC SEY15CPUMC

## (undated) DEVICE — SU ETHILON 10-0 CS160-6 12" 9000G

## (undated) DEVICE — EYE SHIELD PLASTIC

## (undated) DEVICE — PEN MARKING SKIN TYCO DEVON DUAL TIP 31145868

## (undated) DEVICE — EYE DRSG PAD OVAL

## (undated) DEVICE — SOL WATER 10ML VIAL 6332318510

## (undated) DEVICE — NDL 30GA 0.5" 305106

## (undated) DEVICE — TAPE MICROPORE 1"X1.5YD 1530S-1

## (undated) RX ORDER — CEFAZOLIN SODIUM 500 MG/2.2ML
INJECTION, POWDER, FOR SOLUTION INTRAMUSCULAR; INTRAVENOUS
Status: DISPENSED
Start: 2018-05-02

## (undated) RX ORDER — ACETAMINOPHEN 325 MG/1
TABLET ORAL
Status: DISPENSED
Start: 2018-05-02